# Patient Record
Sex: FEMALE | Race: WHITE | NOT HISPANIC OR LATINO
[De-identification: names, ages, dates, MRNs, and addresses within clinical notes are randomized per-mention and may not be internally consistent; named-entity substitution may affect disease eponyms.]

---

## 2017-01-09 ENCOUNTER — RESULT REVIEW (OUTPATIENT)
Age: 72
End: 2017-01-09

## 2017-01-09 ENCOUNTER — OUTPATIENT (OUTPATIENT)
Dept: OUTPATIENT SERVICES | Facility: HOSPITAL | Age: 72
LOS: 1 days | Discharge: HOME | End: 2017-01-09

## 2017-01-09 DIAGNOSIS — C50.919 MALIGNANT NEOPLASM OF UNSPECIFIED SITE OF UNSPECIFIED FEMALE BREAST: ICD-10-CM

## 2017-01-10 ENCOUNTER — LABORATORY RESULT (OUTPATIENT)
Age: 72
End: 2017-01-10

## 2017-01-10 ENCOUNTER — APPOINTMENT (OUTPATIENT)
Dept: HEMATOLOGY ONCOLOGY | Facility: CLINIC | Age: 72
End: 2017-01-10

## 2017-01-10 VITALS
DIASTOLIC BLOOD PRESSURE: 66 MMHG | RESPIRATION RATE: 16 BRPM | OXYGEN SATURATION: 97 % | HEART RATE: 72 BPM | SYSTOLIC BLOOD PRESSURE: 139 MMHG | TEMPERATURE: 97.6 F

## 2017-01-10 DIAGNOSIS — C50.911 MALIGNANT NEOPLASM OF UNSPECIFIED SITE OF RIGHT FEMALE BREAST: ICD-10-CM

## 2017-01-10 DIAGNOSIS — Z87.891 PERSONAL HISTORY OF NICOTINE DEPENDENCE: ICD-10-CM

## 2017-01-10 DIAGNOSIS — Z13.71 ENCOUNTER FOR NONPROCREATIVE SCREENING FOR GENETIC DISEASE CARRIER STATUS: ICD-10-CM

## 2017-01-10 LAB
HCT VFR BLD CALC: 33.4 % — LOW (ref 34.5–45)
HGB BLD-MCNC: 10.8 G/DL — LOW (ref 11.5–15.5)
MCHC RBC-ENTMCNC: 27.3 PG — SIGNIFICANT CHANGE UP (ref 27–34)
MCHC RBC-ENTMCNC: 32.3 GM/DL — SIGNIFICANT CHANGE UP (ref 32–36)
MCV RBC AUTO: 84.6 FL — SIGNIFICANT CHANGE UP (ref 80–100)
PLATELET # BLD AUTO: 265 K/UL — SIGNIFICANT CHANGE UP (ref 150–400)
RBC # BLD: 3.95 M/UL — SIGNIFICANT CHANGE UP (ref 3.8–5.2)
RBC # FLD: 14.2 % — SIGNIFICANT CHANGE UP (ref 10.3–14.5)
WBC # BLD: 5.4 K/UL — SIGNIFICANT CHANGE UP (ref 3.8–10.5)
WBC # FLD AUTO: 5.4 K/UL — SIGNIFICANT CHANGE UP (ref 3.8–10.5)

## 2017-01-11 DIAGNOSIS — C50.911 MALIGNANT NEOPLASM OF UNSPECIFIED SITE OF RIGHT FEMALE BREAST: ICD-10-CM

## 2017-01-23 ENCOUNTER — CLINICAL ADVICE (OUTPATIENT)
Age: 72
End: 2017-01-23

## 2017-01-26 ENCOUNTER — APPOINTMENT (OUTPATIENT)
Dept: INFUSION THERAPY | Facility: HOSPITAL | Age: 72
End: 2017-01-26

## 2017-01-27 ENCOUNTER — MESSAGE (OUTPATIENT)
Age: 72
End: 2017-01-27

## 2017-01-28 PROBLEM — C44.90 UNSPECIFIED MALIGNANT NEOPLASM OF SKIN, UNSPECIFIED: Chronic | Status: ACTIVE | Noted: 2017-01-26

## 2017-01-28 PROBLEM — K57.90 DIVERTICULOSIS OF INTESTINE, PART UNSPECIFIED, WITHOUT PERFORATION OR ABSCESS WITHOUT BLEEDING: Chronic | Status: ACTIVE | Noted: 2017-01-26

## 2017-01-28 PROBLEM — G62.9 POLYNEUROPATHY, UNSPECIFIED: Chronic | Status: ACTIVE | Noted: 2017-01-26

## 2017-01-29 ENCOUNTER — FORM ENCOUNTER (OUTPATIENT)
Age: 72
End: 2017-01-29

## 2017-01-30 ENCOUNTER — CLINICAL ADVICE (OUTPATIENT)
Age: 72
End: 2017-01-30

## 2017-01-30 ENCOUNTER — OUTPATIENT (OUTPATIENT)
Dept: OUTPATIENT SERVICES | Facility: HOSPITAL | Age: 72
LOS: 1 days | End: 2017-01-30
Payer: MEDICARE

## 2017-01-30 DIAGNOSIS — C50.911 MALIGNANT NEOPLASM OF UNSPECIFIED SITE OF RIGHT FEMALE BREAST: ICD-10-CM

## 2017-01-30 DIAGNOSIS — Z90.11 ACQUIRED ABSENCE OF RIGHT BREAST AND NIPPLE: Chronic | ICD-10-CM

## 2017-01-30 PROCEDURE — 76937 US GUIDE VASCULAR ACCESS: CPT | Mod: 26

## 2017-01-30 PROCEDURE — C1894: CPT

## 2017-01-30 PROCEDURE — 77001 FLUOROGUIDE FOR VEIN DEVICE: CPT | Mod: 26

## 2017-01-30 PROCEDURE — C1769: CPT

## 2017-01-30 PROCEDURE — 77001 FLUOROGUIDE FOR VEIN DEVICE: CPT

## 2017-01-30 PROCEDURE — 36561 INSERT TUNNELED CV CATH: CPT

## 2017-01-30 PROCEDURE — C1788: CPT

## 2017-01-30 PROCEDURE — 76937 US GUIDE VASCULAR ACCESS: CPT

## 2017-01-30 PROCEDURE — C1887: CPT

## 2017-01-31 ENCOUNTER — RESULT REVIEW (OUTPATIENT)
Age: 72
End: 2017-01-31

## 2017-02-01 ENCOUNTER — APPOINTMENT (OUTPATIENT)
Dept: INFUSION THERAPY | Facility: HOSPITAL | Age: 72
End: 2017-02-01

## 2017-02-01 DIAGNOSIS — Z45.2 ENCOUNTER FOR ADJUSTMENT AND MANAGEMENT OF VASCULAR ACCESS DEVICE: ICD-10-CM

## 2017-02-01 DIAGNOSIS — C50.911 MALIGNANT NEOPLASM OF UNSPECIFIED SITE OF RIGHT FEMALE BREAST: ICD-10-CM

## 2017-02-01 LAB
HCT VFR BLD CALC: 38.7 % — SIGNIFICANT CHANGE UP (ref 34.5–45)
HGB BLD-MCNC: 12.2 G/DL — SIGNIFICANT CHANGE UP (ref 11.5–15.5)
MCHC RBC-ENTMCNC: 26.8 PG — LOW (ref 27–34)
MCHC RBC-ENTMCNC: 31.5 GM/DL — LOW (ref 32–36)
MCV RBC AUTO: 85 FL — SIGNIFICANT CHANGE UP (ref 80–100)
PLATELET # BLD AUTO: 230 K/UL — SIGNIFICANT CHANGE UP (ref 150–400)
RBC # BLD: 4.56 M/UL — SIGNIFICANT CHANGE UP (ref 3.8–5.2)
RBC # FLD: 13.8 % — SIGNIFICANT CHANGE UP (ref 10.3–14.5)
WBC # BLD: 6.2 K/UL — SIGNIFICANT CHANGE UP (ref 3.8–10.5)
WBC # FLD AUTO: 6.2 K/UL — SIGNIFICANT CHANGE UP (ref 3.8–10.5)

## 2017-02-02 ENCOUNTER — OTHER (OUTPATIENT)
Age: 72
End: 2017-02-02

## 2017-02-02 DIAGNOSIS — Z51.11 ENCOUNTER FOR ANTINEOPLASTIC CHEMOTHERAPY: ICD-10-CM

## 2017-02-02 DIAGNOSIS — R11.2 NAUSEA WITH VOMITING, UNSPECIFIED: ICD-10-CM

## 2017-02-07 ENCOUNTER — RESULT REVIEW (OUTPATIENT)
Age: 72
End: 2017-02-07

## 2017-02-07 ENCOUNTER — OUTPATIENT (OUTPATIENT)
Dept: OUTPATIENT SERVICES | Facility: HOSPITAL | Age: 72
LOS: 1 days | Discharge: HOME | End: 2017-02-07

## 2017-02-07 DIAGNOSIS — Z90.11 ACQUIRED ABSENCE OF RIGHT BREAST AND NIPPLE: Chronic | ICD-10-CM

## 2017-02-07 DIAGNOSIS — C50.911 MALIGNANT NEOPLASM OF UNSPECIFIED SITE OF RIGHT FEMALE BREAST: ICD-10-CM

## 2017-02-08 ENCOUNTER — APPOINTMENT (OUTPATIENT)
Dept: INFUSION THERAPY | Facility: HOSPITAL | Age: 72
End: 2017-02-08

## 2017-02-08 LAB
BASOPHILS # BLD AUTO: 0 K/UL — SIGNIFICANT CHANGE UP (ref 0–0.2)
BASOPHILS NFR BLD AUTO: 0.7 % — SIGNIFICANT CHANGE UP (ref 0–2)
EOSINOPHIL # BLD AUTO: 0.1 K/UL — SIGNIFICANT CHANGE UP (ref 0–0.5)
EOSINOPHIL NFR BLD AUTO: 2.8 % — SIGNIFICANT CHANGE UP (ref 0–6)
HCT VFR BLD CALC: 35.9 % — SIGNIFICANT CHANGE UP (ref 34.5–45)
HGB BLD-MCNC: 11.3 G/DL — LOW (ref 11.5–15.5)
LYMPHOCYTES # BLD AUTO: 1.4 K/UL — SIGNIFICANT CHANGE UP (ref 1–3.3)
LYMPHOCYTES # BLD AUTO: 31.2 % — SIGNIFICANT CHANGE UP (ref 13–44)
MCHC RBC-ENTMCNC: 26.6 PG — LOW (ref 27–34)
MCHC RBC-ENTMCNC: 31.5 GM/DL — LOW (ref 32–36)
MCV RBC AUTO: 84.4 FL — SIGNIFICANT CHANGE UP (ref 80–100)
MONOCYTES # BLD AUTO: 0.3 K/UL — SIGNIFICANT CHANGE UP (ref 0–0.9)
MONOCYTES NFR BLD AUTO: 6.8 % — SIGNIFICANT CHANGE UP (ref 2–14)
NEUTROPHILS # BLD AUTO: 2.5 K/UL — SIGNIFICANT CHANGE UP (ref 1.8–7.4)
NEUTROPHILS NFR BLD AUTO: 58.4 % — SIGNIFICANT CHANGE UP (ref 43–77)
PLATELET # BLD AUTO: 205 K/UL — SIGNIFICANT CHANGE UP (ref 150–400)
RBC # BLD: 4.25 M/UL — SIGNIFICANT CHANGE UP (ref 3.8–5.2)
RBC # FLD: 13.5 % — SIGNIFICANT CHANGE UP (ref 10.3–14.5)
WBC # BLD: 4.3 K/UL — SIGNIFICANT CHANGE UP (ref 3.8–10.5)
WBC # FLD AUTO: 4.3 K/UL — SIGNIFICANT CHANGE UP (ref 3.8–10.5)

## 2017-02-10 DIAGNOSIS — Z51.11 ENCOUNTER FOR ANTINEOPLASTIC CHEMOTHERAPY: ICD-10-CM

## 2017-02-10 DIAGNOSIS — R11.2 NAUSEA WITH VOMITING, UNSPECIFIED: ICD-10-CM

## 2017-02-15 ENCOUNTER — RESULT REVIEW (OUTPATIENT)
Age: 72
End: 2017-02-15

## 2017-02-16 ENCOUNTER — APPOINTMENT (OUTPATIENT)
Dept: INFUSION THERAPY | Facility: HOSPITAL | Age: 72
End: 2017-02-16

## 2017-02-16 ENCOUNTER — APPOINTMENT (OUTPATIENT)
Dept: HEMATOLOGY ONCOLOGY | Facility: CLINIC | Age: 72
End: 2017-02-16

## 2017-02-16 VITALS
SYSTOLIC BLOOD PRESSURE: 143 MMHG | TEMPERATURE: 97.2 F | HEART RATE: 69 BPM | OXYGEN SATURATION: 100 % | WEIGHT: 174.16 LBS | RESPIRATION RATE: 16 BRPM | BODY MASS INDEX: 28.11 KG/M2 | DIASTOLIC BLOOD PRESSURE: 76 MMHG

## 2017-02-16 DIAGNOSIS — J34.89 OTHER SPECIFIED DISORDERS OF NOSE AND NASAL SINUSES: ICD-10-CM

## 2017-02-16 LAB
BASOPHILS # BLD AUTO: 0 K/UL — SIGNIFICANT CHANGE UP (ref 0–0.2)
EOSINOPHIL # BLD AUTO: 0 K/UL — SIGNIFICANT CHANGE UP (ref 0–0.5)
EOSINOPHIL NFR BLD AUTO: 3 % — SIGNIFICANT CHANGE UP (ref 0–6)
HCT VFR BLD CALC: 35 % — SIGNIFICANT CHANGE UP (ref 34.5–45)
HGB BLD-MCNC: 11 G/DL — LOW (ref 11.5–15.5)
LYMPHOCYTES # BLD AUTO: 1 K/UL — SIGNIFICANT CHANGE UP (ref 1–3.3)
LYMPHOCYTES # BLD AUTO: 29 % — SIGNIFICANT CHANGE UP (ref 13–44)
MCHC RBC-ENTMCNC: 26.6 PG — LOW (ref 27–34)
MCHC RBC-ENTMCNC: 31.5 G/DL — LOW (ref 32–36)
MCV RBC AUTO: 84.5 FL — SIGNIFICANT CHANGE UP (ref 80–100)
MONOCYTES # BLD AUTO: 0.1 K/UL — SIGNIFICANT CHANGE UP (ref 0–0.9)
MONOCYTES NFR BLD AUTO: 2 % — SIGNIFICANT CHANGE UP (ref 2–14)
NEUTROPHILS # BLD AUTO: 2.1 K/UL — SIGNIFICANT CHANGE UP (ref 1.8–7.4)
NEUTROPHILS NFR BLD AUTO: 65 % — SIGNIFICANT CHANGE UP (ref 43–77)
NEUTS BAND # BLD: 1 % — SIGNIFICANT CHANGE UP (ref 0–8)
PLAT MORPH BLD: NORMAL — SIGNIFICANT CHANGE UP
PLATELET # BLD AUTO: 167 K/UL — SIGNIFICANT CHANGE UP (ref 150–400)
RBC # BLD: 4.15 M/UL — SIGNIFICANT CHANGE UP (ref 3.8–5.2)
RBC # FLD: 13.6 % — SIGNIFICANT CHANGE UP (ref 10.3–14.5)
RBC BLD AUTO: SIGNIFICANT CHANGE UP
WBC # BLD: 3.2 K/UL — LOW (ref 3.8–10.5)
WBC # FLD AUTO: 3.2 K/UL — LOW (ref 3.8–10.5)

## 2017-02-28 ENCOUNTER — RESULT REVIEW (OUTPATIENT)
Age: 72
End: 2017-02-28

## 2017-02-28 ENCOUNTER — OTHER (OUTPATIENT)
Age: 72
End: 2017-02-28

## 2017-03-01 ENCOUNTER — LABORATORY RESULT (OUTPATIENT)
Age: 72
End: 2017-03-01

## 2017-03-01 ENCOUNTER — APPOINTMENT (OUTPATIENT)
Dept: INFUSION THERAPY | Facility: HOSPITAL | Age: 72
End: 2017-03-01

## 2017-03-01 LAB
BASOPHILS # BLD AUTO: 0 K/UL — SIGNIFICANT CHANGE UP (ref 0–0.2)
BASOPHILS NFR BLD AUTO: 0.8 % — SIGNIFICANT CHANGE UP (ref 0–2)
EOSINOPHIL # BLD AUTO: 0.2 K/UL — SIGNIFICANT CHANGE UP (ref 0–0.5)
EOSINOPHIL NFR BLD AUTO: 5.5 % — SIGNIFICANT CHANGE UP (ref 0–6)
HCT VFR BLD CALC: 34.8 % — SIGNIFICANT CHANGE UP (ref 34.5–45)
HGB BLD-MCNC: 11.6 G/DL — SIGNIFICANT CHANGE UP (ref 11.5–15.5)
LYMPHOCYTES # BLD AUTO: 1 K/UL — SIGNIFICANT CHANGE UP (ref 1–3.3)
LYMPHOCYTES # BLD AUTO: 31.2 % — SIGNIFICANT CHANGE UP (ref 13–44)
MCHC RBC-ENTMCNC: 27.9 PG — SIGNIFICANT CHANGE UP (ref 27–34)
MCHC RBC-ENTMCNC: 33.4 G/DL — SIGNIFICANT CHANGE UP (ref 32–36)
MCV RBC AUTO: 83.7 FL — SIGNIFICANT CHANGE UP (ref 80–100)
MONOCYTES # BLD AUTO: 0.5 K/UL — SIGNIFICANT CHANGE UP (ref 0–0.9)
MONOCYTES NFR BLD AUTO: 15.5 % — HIGH (ref 2–14)
NEUTROPHILS # BLD AUTO: 1.6 K/UL — LOW (ref 1.8–7.4)
NEUTROPHILS NFR BLD AUTO: 47 % — SIGNIFICANT CHANGE UP (ref 43–77)
PLATELET # BLD AUTO: 189 K/UL — SIGNIFICANT CHANGE UP (ref 150–400)
RBC # BLD: 4.16 M/UL — SIGNIFICANT CHANGE UP (ref 3.8–5.2)
RBC # FLD: 15.3 % — HIGH (ref 10.3–14.5)
WBC # BLD: 3.4 K/UL — LOW (ref 3.8–10.5)
WBC # FLD AUTO: 3.4 K/UL — LOW (ref 3.8–10.5)

## 2017-03-06 ENCOUNTER — OUTPATIENT (OUTPATIENT)
Dept: OUTPATIENT SERVICES | Facility: HOSPITAL | Age: 72
LOS: 1 days | Discharge: HOME | End: 2017-03-06

## 2017-03-06 DIAGNOSIS — C50.911 MALIGNANT NEOPLASM OF UNSPECIFIED SITE OF RIGHT FEMALE BREAST: ICD-10-CM

## 2017-03-06 DIAGNOSIS — Z90.11 ACQUIRED ABSENCE OF RIGHT BREAST AND NIPPLE: Chronic | ICD-10-CM

## 2017-03-07 ENCOUNTER — RESULT REVIEW (OUTPATIENT)
Age: 72
End: 2017-03-07

## 2017-03-08 ENCOUNTER — APPOINTMENT (OUTPATIENT)
Dept: INFUSION THERAPY | Facility: HOSPITAL | Age: 72
End: 2017-03-08

## 2017-03-08 ENCOUNTER — OTHER (OUTPATIENT)
Age: 72
End: 2017-03-08

## 2017-03-08 LAB
BASOPHILS # BLD AUTO: 0 K/UL — SIGNIFICANT CHANGE UP (ref 0–0.2)
BASOPHILS NFR BLD AUTO: 0.2 % — SIGNIFICANT CHANGE UP (ref 0–2)
EOSINOPHIL # BLD AUTO: 0.1 K/UL — SIGNIFICANT CHANGE UP (ref 0–0.5)
EOSINOPHIL NFR BLD AUTO: 2.8 % — SIGNIFICANT CHANGE UP (ref 0–6)
HCT VFR BLD CALC: 35 % — SIGNIFICANT CHANGE UP (ref 34.5–45)
HGB BLD-MCNC: 11.6 G/DL — SIGNIFICANT CHANGE UP (ref 11.5–15.5)
LYMPHOCYTES # BLD AUTO: 0.7 K/UL — LOW (ref 1–3.3)
LYMPHOCYTES # BLD AUTO: 24.4 % — SIGNIFICANT CHANGE UP (ref 13–44)
MCHC RBC-ENTMCNC: 27.8 PG — SIGNIFICANT CHANGE UP (ref 27–34)
MCHC RBC-ENTMCNC: 33.2 G/DL — SIGNIFICANT CHANGE UP (ref 32–36)
MCV RBC AUTO: 83.7 FL — SIGNIFICANT CHANGE UP (ref 80–100)
MONOCYTES # BLD AUTO: 0.2 K/UL — SIGNIFICANT CHANGE UP (ref 0–0.9)
MONOCYTES NFR BLD AUTO: 5.1 % — SIGNIFICANT CHANGE UP (ref 2–14)
NEUTROPHILS # BLD AUTO: 2 K/UL — SIGNIFICANT CHANGE UP (ref 1.8–7.4)
NEUTROPHILS NFR BLD AUTO: 67.4 % — SIGNIFICANT CHANGE UP (ref 43–77)
PLATELET # BLD AUTO: 135 K/UL — LOW (ref 150–400)
RBC # BLD: 4.18 M/UL — SIGNIFICANT CHANGE UP (ref 3.8–5.2)
RBC # FLD: 15.5 % — HIGH (ref 10.3–14.5)
WBC # BLD: 3 K/UL — LOW (ref 3.8–10.5)
WBC # FLD AUTO: 3 K/UL — LOW (ref 3.8–10.5)

## 2017-03-09 DIAGNOSIS — Z51.11 ENCOUNTER FOR ANTINEOPLASTIC CHEMOTHERAPY: ICD-10-CM

## 2017-03-09 DIAGNOSIS — R11.2 NAUSEA WITH VOMITING, UNSPECIFIED: ICD-10-CM

## 2017-03-23 ENCOUNTER — RX RENEWAL (OUTPATIENT)
Age: 72
End: 2017-03-23

## 2017-03-27 ENCOUNTER — APPOINTMENT (OUTPATIENT)
Dept: HEMATOLOGY ONCOLOGY | Facility: CLINIC | Age: 72
End: 2017-03-27

## 2017-03-27 VITALS
SYSTOLIC BLOOD PRESSURE: 110 MMHG | HEART RATE: 84 BPM | DIASTOLIC BLOOD PRESSURE: 60 MMHG | TEMPERATURE: 98.1 F | OXYGEN SATURATION: 98 % | WEIGHT: 178.57 LBS | BODY MASS INDEX: 28.82 KG/M2

## 2017-03-27 DIAGNOSIS — F32.9 MAJOR DEPRESSIVE DISORDER, SINGLE EPISODE, UNSPECIFIED: ICD-10-CM

## 2017-03-27 RX ORDER — OMEGA-3/DHA/EPA/FISH OIL 300-1000MG
1000 CAPSULE ORAL
Refills: 0 | Status: DISCONTINUED | COMMUNITY
Start: 2017-01-10 | End: 2017-03-27

## 2017-03-27 RX ORDER — NAPROXEN SODIUM 220 MG
220 TABLET ORAL
Refills: 0 | Status: DISCONTINUED | COMMUNITY
Start: 2017-01-10 | End: 2017-03-27

## 2017-03-27 RX ORDER — ACETAMINOPHEN, ASPIRIN 250; 250 MG/1; MG/1
250-250 TABLET, FILM COATED ORAL
Refills: 0 | Status: DISCONTINUED | COMMUNITY
Start: 2017-01-10 | End: 2017-03-27

## 2017-03-28 ENCOUNTER — RESULT REVIEW (OUTPATIENT)
Age: 72
End: 2017-03-28

## 2017-03-29 ENCOUNTER — LABORATORY RESULT (OUTPATIENT)
Age: 72
End: 2017-03-29

## 2017-03-29 ENCOUNTER — APPOINTMENT (OUTPATIENT)
Dept: INFUSION THERAPY | Facility: HOSPITAL | Age: 72
End: 2017-03-29

## 2017-03-29 LAB
HCT VFR BLD CALC: 34 % — LOW (ref 34.5–45)
HGB BLD-MCNC: 11.4 G/DL — LOW (ref 11.5–15.5)
MCHC RBC-ENTMCNC: 28.1 PG — SIGNIFICANT CHANGE UP (ref 27–34)
MCHC RBC-ENTMCNC: 33.5 G/DL — SIGNIFICANT CHANGE UP (ref 32–36)
MCV RBC AUTO: 83.9 FL — SIGNIFICANT CHANGE UP (ref 80–100)
PLATELET # BLD AUTO: 198 K/UL — SIGNIFICANT CHANGE UP (ref 150–400)
RBC # BLD: 4.05 M/UL — SIGNIFICANT CHANGE UP (ref 3.8–5.2)
RBC # FLD: 17.5 % — HIGH (ref 10.3–14.5)
WBC # BLD: 4.1 K/UL — SIGNIFICANT CHANGE UP (ref 3.8–10.5)
WBC # FLD AUTO: 4.1 K/UL — SIGNIFICANT CHANGE UP (ref 3.8–10.5)

## 2017-04-04 ENCOUNTER — RESULT REVIEW (OUTPATIENT)
Age: 72
End: 2017-04-04

## 2017-04-05 ENCOUNTER — APPOINTMENT (OUTPATIENT)
Dept: INFUSION THERAPY | Facility: HOSPITAL | Age: 72
End: 2017-04-05

## 2017-04-05 LAB
BASOPHILS # BLD AUTO: 0 K/UL — SIGNIFICANT CHANGE UP (ref 0–0.2)
BASOPHILS NFR BLD AUTO: 0.3 % — SIGNIFICANT CHANGE UP (ref 0–2)
EOSINOPHIL # BLD AUTO: 0.2 K/UL — SIGNIFICANT CHANGE UP (ref 0–0.5)
EOSINOPHIL NFR BLD AUTO: 5.6 % — SIGNIFICANT CHANGE UP (ref 0–6)
HCT VFR BLD CALC: 31.9 % — LOW (ref 34.5–45)
HGB BLD-MCNC: 10.5 G/DL — LOW (ref 11.5–15.5)
LYMPHOCYTES # BLD AUTO: 0.7 K/UL — LOW (ref 1–3.3)
LYMPHOCYTES # BLD AUTO: 18.4 % — SIGNIFICANT CHANGE UP (ref 13–44)
MCHC RBC-ENTMCNC: 27.8 PG — SIGNIFICANT CHANGE UP (ref 27–34)
MCHC RBC-ENTMCNC: 32.8 G/DL — SIGNIFICANT CHANGE UP (ref 32–36)
MCV RBC AUTO: 84.7 FL — SIGNIFICANT CHANGE UP (ref 80–100)
MONOCYTES # BLD AUTO: 0.4 K/UL — SIGNIFICANT CHANGE UP (ref 0–0.9)
MONOCYTES NFR BLD AUTO: 9.3 % — SIGNIFICANT CHANGE UP (ref 2–14)
NEUTROPHILS # BLD AUTO: 2.5 K/UL — SIGNIFICANT CHANGE UP (ref 1.8–7.4)
NEUTROPHILS NFR BLD AUTO: 66.4 % — SIGNIFICANT CHANGE UP (ref 43–77)
PLATELET # BLD AUTO: 147 K/UL — LOW (ref 150–400)
RBC # BLD: 3.77 M/UL — LOW (ref 3.8–5.2)
RBC # FLD: 17.2 % — HIGH (ref 10.3–14.5)
WBC # BLD: 3.8 K/UL — SIGNIFICANT CHANGE UP (ref 3.8–10.5)
WBC # FLD AUTO: 3.8 K/UL — SIGNIFICANT CHANGE UP (ref 3.8–10.5)

## 2017-04-07 ENCOUNTER — RX RENEWAL (OUTPATIENT)
Age: 72
End: 2017-04-07

## 2017-04-10 ENCOUNTER — RESULT REVIEW (OUTPATIENT)
Age: 72
End: 2017-04-10

## 2017-04-10 ENCOUNTER — OUTPATIENT (OUTPATIENT)
Dept: OUTPATIENT SERVICES | Facility: HOSPITAL | Age: 72
LOS: 1 days | Discharge: HOME | End: 2017-04-10

## 2017-04-10 DIAGNOSIS — Z90.11 ACQUIRED ABSENCE OF RIGHT BREAST AND NIPPLE: Chronic | ICD-10-CM

## 2017-04-10 DIAGNOSIS — C50.911 MALIGNANT NEOPLASM OF UNSPECIFIED SITE OF RIGHT FEMALE BREAST: ICD-10-CM

## 2017-04-11 ENCOUNTER — LABORATORY RESULT (OUTPATIENT)
Age: 72
End: 2017-04-11

## 2017-04-11 ENCOUNTER — APPOINTMENT (OUTPATIENT)
Dept: HEMATOLOGY ONCOLOGY | Facility: CLINIC | Age: 72
End: 2017-04-11

## 2017-04-11 VITALS
SYSTOLIC BLOOD PRESSURE: 114 MMHG | TEMPERATURE: 98 F | BODY MASS INDEX: 28.29 KG/M2 | RESPIRATION RATE: 16 BRPM | DIASTOLIC BLOOD PRESSURE: 64 MMHG | OXYGEN SATURATION: 100 % | HEART RATE: 93 BPM | WEIGHT: 175.26 LBS

## 2017-04-11 LAB
BASOPHILS # BLD AUTO: 0 K/UL — SIGNIFICANT CHANGE UP (ref 0–0.2)
BASOPHILS NFR BLD AUTO: 0.3 % — SIGNIFICANT CHANGE UP (ref 0–2)
EOSINOPHIL # BLD AUTO: 0.3 K/UL — SIGNIFICANT CHANGE UP (ref 0–0.5)
EOSINOPHIL NFR BLD AUTO: 7.1 % — HIGH (ref 0–6)
HCT VFR BLD CALC: 32.9 % — LOW (ref 34.5–45)
HGB BLD-MCNC: 11 G/DL — LOW (ref 11.5–15.5)
LYMPHOCYTES # BLD AUTO: 0.4 K/UL — LOW (ref 1–3.3)
LYMPHOCYTES # BLD AUTO: 9.9 % — LOW (ref 13–44)
MCHC RBC-ENTMCNC: 28.4 PG — SIGNIFICANT CHANGE UP (ref 27–34)
MCHC RBC-ENTMCNC: 33.4 G/DL — SIGNIFICANT CHANGE UP (ref 32–36)
MCV RBC AUTO: 85.1 FL — SIGNIFICANT CHANGE UP (ref 80–100)
MONOCYTES # BLD AUTO: 0 K/UL — SIGNIFICANT CHANGE UP (ref 0–0.9)
MONOCYTES NFR BLD AUTO: 1.2 % — LOW (ref 2–14)
NEUTROPHILS # BLD AUTO: 3 K/UL — SIGNIFICANT CHANGE UP (ref 1.8–7.4)
NEUTROPHILS NFR BLD AUTO: 81.5 % — HIGH (ref 43–77)
PLATELET # BLD AUTO: 133 K/UL — LOW (ref 150–400)
RBC # BLD: 3.86 M/UL — SIGNIFICANT CHANGE UP (ref 3.8–5.2)
RBC # FLD: 17.5 % — HIGH (ref 10.3–14.5)
TSH SERPL-ACNC: 2.8 UIU/ML
WBC # BLD: 3.7 K/UL — LOW (ref 3.8–10.5)
WBC # FLD AUTO: 3.7 K/UL — LOW (ref 3.8–10.5)

## 2017-04-11 RX ORDER — ACYCLOVIR 50 MG/G
5 OINTMENT TOPICAL 3 TIMES DAILY
Qty: 1 | Refills: 1 | Status: DISCONTINUED | COMMUNITY
Start: 2017-02-16 | End: 2017-04-11

## 2017-04-12 ENCOUNTER — CLINICAL ADVICE (OUTPATIENT)
Age: 72
End: 2017-04-12

## 2017-04-12 LAB
T3RU NFR SERPL: 1.2 INDEX
T4 SERPL-MCNC: 10.9 UG/DL

## 2017-04-25 ENCOUNTER — CLINICAL ADVICE (OUTPATIENT)
Age: 72
End: 2017-04-25

## 2017-04-27 ENCOUNTER — APPOINTMENT (OUTPATIENT)
Dept: HEMATOLOGY ONCOLOGY | Facility: CLINIC | Age: 72
End: 2017-04-27

## 2017-04-27 VITALS
BODY MASS INDEX: 28.82 KG/M2 | TEMPERATURE: 97.7 F | OXYGEN SATURATION: 96 % | DIASTOLIC BLOOD PRESSURE: 60 MMHG | RESPIRATION RATE: 16 BRPM | HEART RATE: 99 BPM | WEIGHT: 178.55 LBS | SYSTOLIC BLOOD PRESSURE: 110 MMHG

## 2017-05-02 ENCOUNTER — RESULT REVIEW (OUTPATIENT)
Age: 72
End: 2017-05-02

## 2017-05-03 ENCOUNTER — LABORATORY RESULT (OUTPATIENT)
Age: 72
End: 2017-05-03

## 2017-05-03 ENCOUNTER — APPOINTMENT (OUTPATIENT)
Dept: INFUSION THERAPY | Facility: HOSPITAL | Age: 72
End: 2017-05-03

## 2017-05-03 LAB
HCT VFR BLD CALC: 32.3 % — LOW (ref 34.5–45)
HGB BLD-MCNC: 10.7 G/DL — LOW (ref 11.5–15.5)
MCHC RBC-ENTMCNC: 29.8 PG — SIGNIFICANT CHANGE UP (ref 27–34)
MCHC RBC-ENTMCNC: 33.2 G/DL — SIGNIFICANT CHANGE UP (ref 32–36)
MCV RBC AUTO: 89.9 FL — SIGNIFICANT CHANGE UP (ref 80–100)
PLATELET # BLD AUTO: 195 K/UL — SIGNIFICANT CHANGE UP (ref 150–400)
RBC # BLD: 3.59 M/UL — LOW (ref 3.8–5.2)
RBC # FLD: 19.7 % — HIGH (ref 10.3–14.5)
WBC # BLD: 5.9 K/UL — SIGNIFICANT CHANGE UP (ref 3.8–10.5)
WBC # FLD AUTO: 5.9 K/UL — SIGNIFICANT CHANGE UP (ref 3.8–10.5)

## 2017-05-04 DIAGNOSIS — Z51.11 ENCOUNTER FOR ANTINEOPLASTIC CHEMOTHERAPY: ICD-10-CM

## 2017-05-04 DIAGNOSIS — R11.2 NAUSEA WITH VOMITING, UNSPECIFIED: ICD-10-CM

## 2017-05-09 ENCOUNTER — OUTPATIENT (OUTPATIENT)
Dept: OUTPATIENT SERVICES | Facility: HOSPITAL | Age: 72
LOS: 1 days | Discharge: HOME | End: 2017-05-09

## 2017-05-09 DIAGNOSIS — Z90.11 ACQUIRED ABSENCE OF RIGHT BREAST AND NIPPLE: Chronic | ICD-10-CM

## 2017-05-09 DIAGNOSIS — C50.911 MALIGNANT NEOPLASM OF UNSPECIFIED SITE OF RIGHT FEMALE BREAST: ICD-10-CM

## 2017-05-10 ENCOUNTER — APPOINTMENT (OUTPATIENT)
Dept: INFUSION THERAPY | Facility: HOSPITAL | Age: 72
End: 2017-05-10

## 2017-05-10 ENCOUNTER — RESULT REVIEW (OUTPATIENT)
Age: 72
End: 2017-05-10

## 2017-05-10 LAB
HCT VFR BLD CALC: 30.4 % — LOW (ref 34.5–45)
HGB BLD-MCNC: 10.4 G/DL — LOW (ref 11.5–15.5)
MCHC RBC-ENTMCNC: 30.6 PG — SIGNIFICANT CHANGE UP (ref 27–34)
MCHC RBC-ENTMCNC: 34.3 G/DL — SIGNIFICANT CHANGE UP (ref 32–36)
MCV RBC AUTO: 89.3 FL — SIGNIFICANT CHANGE UP (ref 80–100)
PLATELET # BLD AUTO: 163 K/UL — SIGNIFICANT CHANGE UP (ref 150–400)
RBC # BLD: 3.4 M/UL — LOW (ref 3.8–5.2)
RBC # FLD: 18.9 % — HIGH (ref 10.3–14.5)
WBC # BLD: 4.8 K/UL — SIGNIFICANT CHANGE UP (ref 3.8–10.5)
WBC # FLD AUTO: 4.8 K/UL — SIGNIFICANT CHANGE UP (ref 3.8–10.5)

## 2017-05-11 DIAGNOSIS — Z51.11 ENCOUNTER FOR ANTINEOPLASTIC CHEMOTHERAPY: ICD-10-CM

## 2017-05-11 DIAGNOSIS — R11.2 NAUSEA WITH VOMITING, UNSPECIFIED: ICD-10-CM

## 2017-05-12 ENCOUNTER — CLINICAL ADVICE (OUTPATIENT)
Age: 72
End: 2017-05-12

## 2017-05-22 ENCOUNTER — RESULT REVIEW (OUTPATIENT)
Age: 72
End: 2017-05-22

## 2017-05-22 ENCOUNTER — APPOINTMENT (OUTPATIENT)
Dept: HEMATOLOGY ONCOLOGY | Facility: CLINIC | Age: 72
End: 2017-05-22

## 2017-05-22 VITALS
TEMPERATURE: 98.4 F | HEART RATE: 79 BPM | OXYGEN SATURATION: 97 % | RESPIRATION RATE: 16 BRPM | BODY MASS INDEX: 29.07 KG/M2 | WEIGHT: 180.11 LBS | DIASTOLIC BLOOD PRESSURE: 68 MMHG | SYSTOLIC BLOOD PRESSURE: 117 MMHG

## 2017-05-22 LAB
BASOPHILS NFR BLD AUTO: 2 % — SIGNIFICANT CHANGE UP (ref 0–2)
EOSINOPHIL NFR BLD AUTO: 8 % — HIGH (ref 0–6)
HCT VFR BLD CALC: 29.3 % — LOW (ref 34.5–45)
HGB BLD-MCNC: 9.9 G/DL — LOW (ref 11.5–15.5)
LYMPHOCYTES # BLD AUTO: 0.5 K/UL — LOW (ref 1–3.3)
LYMPHOCYTES # BLD AUTO: 53 % — HIGH (ref 13–44)
MCHC RBC-ENTMCNC: 31.9 PG — SIGNIFICANT CHANGE UP (ref 27–34)
MCHC RBC-ENTMCNC: 34 G/DL — SIGNIFICANT CHANGE UP (ref 32–36)
MCV RBC AUTO: 93.9 FL — SIGNIFICANT CHANGE UP (ref 80–100)
MONOCYTES # BLD AUTO: 0.3 K/UL — SIGNIFICANT CHANGE UP (ref 0–0.9)
MONOCYTES NFR BLD AUTO: 17 % — HIGH (ref 2–14)
NEUTROPHILS # BLD AUTO: 0.4 K/UL — LOW (ref 1.8–7.4)
NEUTROPHILS NFR BLD AUTO: 19 % — LOW (ref 43–77)
NEUTS BAND # BLD: 1 % — SIGNIFICANT CHANGE UP (ref 0–8)
PLAT MORPH BLD: NORMAL — SIGNIFICANT CHANGE UP
PLATELET # BLD AUTO: 155 K/UL — SIGNIFICANT CHANGE UP (ref 150–400)
RBC # BLD: 3.12 M/UL — LOW (ref 3.8–5.2)
RBC # FLD: 20 % — HIGH (ref 10.3–14.5)
RBC BLD AUTO: SIGNIFICANT CHANGE UP
WBC # BLD: 1.2 K/UL — LOW (ref 3.8–10.5)
WBC # FLD AUTO: 1.2 K/UL — LOW (ref 3.8–10.5)

## 2017-05-22 RX ORDER — CYCLOPHOSPHAMIDE 25 MG/1
25 CAPSULE ORAL
Qty: 14 | Refills: 6 | Status: DISCONTINUED | COMMUNITY
Start: 2017-01-30 | End: 2017-05-22

## 2017-05-31 ENCOUNTER — RESULT REVIEW (OUTPATIENT)
Age: 72
End: 2017-05-31

## 2017-05-31 ENCOUNTER — APPOINTMENT (OUTPATIENT)
Dept: INFUSION THERAPY | Facility: HOSPITAL | Age: 72
End: 2017-05-31

## 2017-05-31 LAB
ANISOCYTOSIS BLD QL: SLIGHT — SIGNIFICANT CHANGE UP
BASO STIPL BLD QL SMEAR: PRESENT — SIGNIFICANT CHANGE UP
BASOPHILS # BLD AUTO: 0 K/UL — SIGNIFICANT CHANGE UP (ref 0–0.2)
BASOPHILS NFR BLD AUTO: 2 % — SIGNIFICANT CHANGE UP (ref 0–2)
DACRYOCYTES BLD QL SMEAR: SLIGHT — SIGNIFICANT CHANGE UP
ELLIPTOCYTES BLD QL SMEAR: SLIGHT — SIGNIFICANT CHANGE UP
EOSINOPHIL # BLD AUTO: 0 K/UL — SIGNIFICANT CHANGE UP (ref 0–0.5)
EOSINOPHIL NFR BLD AUTO: 1 % — SIGNIFICANT CHANGE UP (ref 0–6)
HCT VFR BLD CALC: 32.8 % — LOW (ref 34.5–45)
HGB BLD-MCNC: 10.8 G/DL — LOW (ref 11.5–15.5)
HYPOCHROMIA BLD QL: SLIGHT — SIGNIFICANT CHANGE UP
LYMPHOCYTES # BLD AUTO: 0.9 K/UL — LOW (ref 1–3.3)
LYMPHOCYTES # BLD AUTO: 25 % — SIGNIFICANT CHANGE UP (ref 13–44)
MACROCYTES BLD QL: SLIGHT — SIGNIFICANT CHANGE UP
MCHC RBC-ENTMCNC: 31.4 PG — SIGNIFICANT CHANGE UP (ref 27–34)
MCHC RBC-ENTMCNC: 32.8 G/DL — SIGNIFICANT CHANGE UP (ref 32–36)
MCV RBC AUTO: 95.7 FL — SIGNIFICANT CHANGE UP (ref 80–100)
MONOCYTES # BLD AUTO: 0.6 K/UL — SIGNIFICANT CHANGE UP (ref 0–0.9)
MONOCYTES NFR BLD AUTO: 21 % — HIGH (ref 2–14)
NEUTROPHILS # BLD AUTO: 1.8 K/UL — SIGNIFICANT CHANGE UP (ref 1.8–7.4)
NEUTROPHILS NFR BLD AUTO: 51 % — SIGNIFICANT CHANGE UP (ref 43–77)
PLAT MORPH BLD: NORMAL — SIGNIFICANT CHANGE UP
PLATELET # BLD AUTO: 174 K/UL — SIGNIFICANT CHANGE UP (ref 150–400)
POIKILOCYTOSIS BLD QL AUTO: SLIGHT — SIGNIFICANT CHANGE UP
POLYCHROMASIA BLD QL SMEAR: SLIGHT — SIGNIFICANT CHANGE UP
RBC # BLD: 3.42 M/UL — LOW (ref 3.8–5.2)
RBC # FLD: 19 % — HIGH (ref 10.3–14.5)
RBC BLD AUTO: ABNORMAL
WBC # BLD: 3.3 K/UL — LOW (ref 3.8–10.5)
WBC # FLD AUTO: 3.3 K/UL — LOW (ref 3.8–10.5)

## 2017-06-01 ENCOUNTER — MESSAGE (OUTPATIENT)
Age: 72
End: 2017-06-01

## 2017-06-07 ENCOUNTER — LABORATORY RESULT (OUTPATIENT)
Age: 72
End: 2017-06-07

## 2017-06-07 ENCOUNTER — RESULT REVIEW (OUTPATIENT)
Age: 72
End: 2017-06-07

## 2017-06-07 ENCOUNTER — APPOINTMENT (OUTPATIENT)
Dept: INFUSION THERAPY | Facility: HOSPITAL | Age: 72
End: 2017-06-07

## 2017-06-07 LAB
HCT VFR BLD CALC: 30.4 % — LOW (ref 34.5–45)
HGB BLD-MCNC: 10.1 G/DL — LOW (ref 11.5–15.5)
MCHC RBC-ENTMCNC: 31.9 PG — SIGNIFICANT CHANGE UP (ref 27–34)
MCHC RBC-ENTMCNC: 33.2 G/DL — SIGNIFICANT CHANGE UP (ref 32–36)
MCV RBC AUTO: 96.3 FL — SIGNIFICANT CHANGE UP (ref 80–100)
PLATELET # BLD AUTO: 128 K/UL — LOW (ref 150–400)
RBC # BLD: 3.16 M/UL — LOW (ref 3.8–5.2)
RBC # FLD: 17 % — HIGH (ref 10.3–14.5)
WBC # BLD: 4.2 K/UL — SIGNIFICANT CHANGE UP (ref 3.8–10.5)
WBC # FLD AUTO: 4.2 K/UL — SIGNIFICANT CHANGE UP (ref 3.8–10.5)

## 2017-06-09 ENCOUNTER — RESULT REVIEW (OUTPATIENT)
Age: 72
End: 2017-06-09

## 2017-06-14 ENCOUNTER — OUTPATIENT (OUTPATIENT)
Dept: OUTPATIENT SERVICES | Facility: HOSPITAL | Age: 72
LOS: 1 days | Discharge: HOME | End: 2017-06-14

## 2017-06-14 DIAGNOSIS — C50.911 MALIGNANT NEOPLASM OF UNSPECIFIED SITE OF RIGHT FEMALE BREAST: ICD-10-CM

## 2017-06-14 DIAGNOSIS — Z90.11 ACQUIRED ABSENCE OF RIGHT BREAST AND NIPPLE: Chronic | ICD-10-CM

## 2017-06-21 ENCOUNTER — APPOINTMENT (OUTPATIENT)
Dept: INFUSION THERAPY | Facility: HOSPITAL | Age: 72
End: 2017-06-21

## 2017-06-22 ENCOUNTER — RESULT REVIEW (OUTPATIENT)
Age: 72
End: 2017-06-22

## 2017-06-22 ENCOUNTER — APPOINTMENT (OUTPATIENT)
Dept: HEMATOLOGY ONCOLOGY | Facility: CLINIC | Age: 72
End: 2017-06-22

## 2017-06-22 VITALS
TEMPERATURE: 98.5 F | WEIGHT: 179.9 LBS | OXYGEN SATURATION: 97 % | DIASTOLIC BLOOD PRESSURE: 71 MMHG | BODY MASS INDEX: 29.04 KG/M2 | HEART RATE: 81 BPM | RESPIRATION RATE: 16 BRPM | SYSTOLIC BLOOD PRESSURE: 152 MMHG

## 2017-06-22 LAB
ANISOCYTOSIS BLD QL: SLIGHT — SIGNIFICANT CHANGE UP
BASO STIPL BLD QL SMEAR: PRESENT — SIGNIFICANT CHANGE UP
BASOPHILS # BLD AUTO: 0 K/UL — SIGNIFICANT CHANGE UP (ref 0–0.2)
BASOPHILS NFR BLD AUTO: 3 % — HIGH (ref 0–2)
DACRYOCYTES BLD QL SMEAR: SLIGHT — SIGNIFICANT CHANGE UP
ELLIPTOCYTES BLD QL SMEAR: SLIGHT — SIGNIFICANT CHANGE UP
EOSINOPHIL # BLD AUTO: 0.1 K/UL — SIGNIFICANT CHANGE UP (ref 0–0.5)
EOSINOPHIL NFR BLD AUTO: 8 % — HIGH (ref 0–6)
HCT VFR BLD CALC: 30 % — LOW (ref 34.5–45)
HGB BLD-MCNC: 10.1 G/DL — LOW (ref 11.5–15.5)
HYPOCHROMIA BLD QL: SLIGHT — SIGNIFICANT CHANGE UP
LYMPHOCYTES # BLD AUTO: 0.7 K/UL — LOW (ref 1–3.3)
LYMPHOCYTES # BLD AUTO: 40 % — SIGNIFICANT CHANGE UP (ref 13–44)
MACROCYTES BLD QL: SLIGHT — SIGNIFICANT CHANGE UP
MCHC RBC-ENTMCNC: 33.4 PG — SIGNIFICANT CHANGE UP (ref 27–34)
MCHC RBC-ENTMCNC: 33.7 G/DL — SIGNIFICANT CHANGE UP (ref 32–36)
MCV RBC AUTO: 99.2 FL — SIGNIFICANT CHANGE UP (ref 80–100)
MONOCYTES # BLD AUTO: 0.3 K/UL — SIGNIFICANT CHANGE UP (ref 0–0.9)
MONOCYTES NFR BLD AUTO: 13 % — SIGNIFICANT CHANGE UP (ref 2–14)
NEUTROPHILS # BLD AUTO: 0.7 K/UL — LOW (ref 1.8–7.4)
NEUTROPHILS NFR BLD AUTO: 33 % — LOW (ref 43–77)
NEUTS BAND # BLD: 3 % — SIGNIFICANT CHANGE UP (ref 0–8)
PLAT MORPH BLD: NORMAL — SIGNIFICANT CHANGE UP
PLATELET # BLD AUTO: 162 K/UL — SIGNIFICANT CHANGE UP (ref 150–400)
POIKILOCYTOSIS BLD QL AUTO: SLIGHT — SIGNIFICANT CHANGE UP
POLYCHROMASIA BLD QL SMEAR: SLIGHT — SIGNIFICANT CHANGE UP
RBC # BLD: 3.02 M/UL — LOW (ref 3.8–5.2)
RBC # FLD: 16.7 % — HIGH (ref 10.3–14.5)
RBC BLD AUTO: ABNORMAL
WBC # BLD: 1.9 K/UL — LOW (ref 3.8–10.5)
WBC # FLD AUTO: 1.9 K/UL — LOW (ref 3.8–10.5)

## 2017-06-28 ENCOUNTER — RESULT REVIEW (OUTPATIENT)
Age: 72
End: 2017-06-28

## 2017-06-28 ENCOUNTER — APPOINTMENT (OUTPATIENT)
Dept: INFUSION THERAPY | Facility: HOSPITAL | Age: 72
End: 2017-06-28

## 2017-06-28 LAB
BASOPHILS # BLD AUTO: 0 K/UL — SIGNIFICANT CHANGE UP (ref 0–0.2)
BASOPHILS NFR BLD AUTO: 1.2 % — SIGNIFICANT CHANGE UP (ref 0–2)
EOSINOPHIL # BLD AUTO: 0.3 K/UL — SIGNIFICANT CHANGE UP (ref 0–0.5)
EOSINOPHIL NFR BLD AUTO: 9.2 % — HIGH (ref 0–6)
HCT VFR BLD CALC: 32 % — LOW (ref 34.5–45)
HGB BLD-MCNC: 10.9 G/DL — LOW (ref 11.5–15.5)
LYMPHOCYTES # BLD AUTO: 0.8 K/UL — LOW (ref 1–3.3)
LYMPHOCYTES # BLD AUTO: 27.2 % — SIGNIFICANT CHANGE UP (ref 13–44)
MCHC RBC-ENTMCNC: 33.8 PG — SIGNIFICANT CHANGE UP (ref 27–34)
MCHC RBC-ENTMCNC: 34 G/DL — SIGNIFICANT CHANGE UP (ref 32–36)
MCV RBC AUTO: 99.4 FL — SIGNIFICANT CHANGE UP (ref 80–100)
MONOCYTES # BLD AUTO: 0.5 K/UL — SIGNIFICANT CHANGE UP (ref 0–0.9)
MONOCYTES NFR BLD AUTO: 16.6 % — HIGH (ref 2–14)
NEUTROPHILS # BLD AUTO: 1.4 K/UL — LOW (ref 1.8–7.4)
NEUTROPHILS NFR BLD AUTO: 46 % — SIGNIFICANT CHANGE UP (ref 43–77)
PLATELET # BLD AUTO: 153 K/UL — SIGNIFICANT CHANGE UP (ref 150–400)
RBC # BLD: 3.22 M/UL — LOW (ref 3.8–5.2)
RBC # FLD: 16.1 % — HIGH (ref 10.3–14.5)
WBC # BLD: 3.1 K/UL — LOW (ref 3.8–10.5)
WBC # FLD AUTO: 3.1 K/UL — LOW (ref 3.8–10.5)

## 2017-06-29 ENCOUNTER — OUTPATIENT (OUTPATIENT)
Dept: OUTPATIENT SERVICES | Facility: HOSPITAL | Age: 72
LOS: 1 days | Discharge: HOME | End: 2017-06-29

## 2017-06-29 DIAGNOSIS — R11.2 NAUSEA WITH VOMITING, UNSPECIFIED: ICD-10-CM

## 2017-06-29 DIAGNOSIS — Z51.11 ENCOUNTER FOR ANTINEOPLASTIC CHEMOTHERAPY: ICD-10-CM

## 2017-06-29 DIAGNOSIS — Z90.11 ACQUIRED ABSENCE OF RIGHT BREAST AND NIPPLE: Chronic | ICD-10-CM

## 2017-07-05 ENCOUNTER — RESULT REVIEW (OUTPATIENT)
Age: 72
End: 2017-07-05

## 2017-07-05 ENCOUNTER — APPOINTMENT (OUTPATIENT)
Dept: INFUSION THERAPY | Facility: HOSPITAL | Age: 72
End: 2017-07-05

## 2017-07-05 LAB
BASOPHILS # BLD AUTO: 0 K/UL — SIGNIFICANT CHANGE UP (ref 0–0.2)
BASOPHILS NFR BLD AUTO: 0.5 % — SIGNIFICANT CHANGE UP (ref 0–2)
EOSINOPHIL # BLD AUTO: 0.4 K/UL — SIGNIFICANT CHANGE UP (ref 0–0.5)
EOSINOPHIL NFR BLD AUTO: 10 % — HIGH (ref 0–6)
HCT VFR BLD CALC: 31.7 % — LOW (ref 34.5–45)
HGB BLD-MCNC: 10.8 G/DL — LOW (ref 11.5–15.5)
LYMPHOCYTES # BLD AUTO: 0.9 K/UL — LOW (ref 1–3.3)
LYMPHOCYTES # BLD AUTO: 24.3 % — SIGNIFICANT CHANGE UP (ref 13–44)
MCHC RBC-ENTMCNC: 33.9 PG — SIGNIFICANT CHANGE UP (ref 27–34)
MCHC RBC-ENTMCNC: 34.2 G/DL — SIGNIFICANT CHANGE UP (ref 32–36)
MCV RBC AUTO: 99.2 FL — SIGNIFICANT CHANGE UP (ref 80–100)
MONOCYTES # BLD AUTO: 0.2 K/UL — SIGNIFICANT CHANGE UP (ref 0–0.9)
MONOCYTES NFR BLD AUTO: 5.4 % — SIGNIFICANT CHANGE UP (ref 2–14)
NEUTROPHILS # BLD AUTO: 2.1 K/UL — SIGNIFICANT CHANGE UP (ref 1.8–7.4)
NEUTROPHILS NFR BLD AUTO: 59.8 % — SIGNIFICANT CHANGE UP (ref 43–77)
PLATELET # BLD AUTO: 93 K/UL — LOW (ref 150–400)
RBC # BLD: 3.19 M/UL — LOW (ref 3.8–5.2)
RBC # FLD: 15.5 % — HIGH (ref 10.3–14.5)
WBC # BLD: 3.6 K/UL — LOW (ref 3.8–10.5)
WBC # FLD AUTO: 3.6 K/UL — LOW (ref 3.8–10.5)

## 2017-07-07 ENCOUNTER — APPOINTMENT (OUTPATIENT)
Dept: RADIATION ONCOLOGY | Facility: CLINIC | Age: 72
End: 2017-07-07

## 2017-07-11 ENCOUNTER — CLINICAL ADVICE (OUTPATIENT)
Age: 72
End: 2017-07-11

## 2017-07-12 ENCOUNTER — OTHER (OUTPATIENT)
Age: 72
End: 2017-07-12

## 2017-07-13 ENCOUNTER — OUTPATIENT (OUTPATIENT)
Dept: OUTPATIENT SERVICES | Facility: HOSPITAL | Age: 72
LOS: 1 days | Discharge: HOME | End: 2017-07-13

## 2017-07-13 DIAGNOSIS — C50.911 MALIGNANT NEOPLASM OF UNSPECIFIED SITE OF RIGHT FEMALE BREAST: ICD-10-CM

## 2017-07-13 DIAGNOSIS — Z90.11 ACQUIRED ABSENCE OF RIGHT BREAST AND NIPPLE: Chronic | ICD-10-CM

## 2017-07-18 ENCOUNTER — RESULT REVIEW (OUTPATIENT)
Age: 72
End: 2017-07-18

## 2017-07-18 ENCOUNTER — APPOINTMENT (OUTPATIENT)
Dept: HEMATOLOGY ONCOLOGY | Facility: CLINIC | Age: 72
End: 2017-07-18

## 2017-07-18 VITALS
OXYGEN SATURATION: 96 % | BODY MASS INDEX: 29.18 KG/M2 | TEMPERATURE: 98.1 F | RESPIRATION RATE: 16 BRPM | DIASTOLIC BLOOD PRESSURE: 69 MMHG | HEART RATE: 73 BPM | SYSTOLIC BLOOD PRESSURE: 124 MMHG | WEIGHT: 180.78 LBS

## 2017-07-18 LAB
ALBUMIN SERPL ELPH-MCNC: 4.2 G/DL
ALP BLD-CCNC: 52 U/L
ALT SERPL-CCNC: 22 U/L
ANION GAP SERPL CALC-SCNC: 15 MMOL/L
ANISOCYTOSIS BLD QL: SLIGHT — SIGNIFICANT CHANGE UP
APTT BLD: 28.9 SEC
AST SERPL-CCNC: 24 U/L
BASOPHILS # BLD AUTO: 0 K/UL — SIGNIFICANT CHANGE UP (ref 0–0.2)
BILIRUB SERPL-MCNC: 0.2 MG/DL
BUN SERPL-MCNC: 20 MG/DL
CALCIUM SERPL-MCNC: 10 MG/DL
CHLORIDE SERPL-SCNC: 106 MMOL/L
CO2 SERPL-SCNC: 24 MMOL/L
CREAT SERPL-MCNC: 0.7 MG/DL
DACRYOCYTES BLD QL SMEAR: SLIGHT — SIGNIFICANT CHANGE UP
ELLIPTOCYTES BLD QL SMEAR: SLIGHT — SIGNIFICANT CHANGE UP
EOSINOPHIL # BLD AUTO: 0.2 K/UL — SIGNIFICANT CHANGE UP (ref 0–0.5)
EOSINOPHIL NFR BLD AUTO: 15 % — HIGH (ref 0–6)
GLUCOSE SERPL-MCNC: 101 MG/DL
HCT VFR BLD CALC: 33.4 % — LOW (ref 34.5–45)
HGB BLD-MCNC: 11.2 G/DL — LOW (ref 11.5–15.5)
HYPOCHROMIA BLD QL: SLIGHT — SIGNIFICANT CHANGE UP
INR PPP: 0.97 RATIO
LYMPHOCYTES # BLD AUTO: 0.6 K/UL — LOW (ref 1–3.3)
LYMPHOCYTES # BLD AUTO: 31 % — SIGNIFICANT CHANGE UP (ref 13–44)
MACROCYTES BLD QL: SLIGHT — SIGNIFICANT CHANGE UP
MCHC RBC-ENTMCNC: 32.8 PG — SIGNIFICANT CHANGE UP (ref 27–34)
MCHC RBC-ENTMCNC: 33.6 G/DL — SIGNIFICANT CHANGE UP (ref 32–36)
MCV RBC AUTO: 97.6 FL — SIGNIFICANT CHANGE UP (ref 80–100)
MICROCYTES BLD QL: SLIGHT — SIGNIFICANT CHANGE UP
MONOCYTES # BLD AUTO: 0.4 K/UL — SIGNIFICANT CHANGE UP (ref 0–0.9)
MONOCYTES NFR BLD AUTO: 21 % — HIGH (ref 2–14)
NEUTROPHILS # BLD AUTO: 0.7 K/UL — LOW (ref 1.8–7.4)
NEUTROPHILS NFR BLD AUTO: 33 % — LOW (ref 43–77)
PLAT MORPH BLD: NORMAL — SIGNIFICANT CHANGE UP
PLATELET # BLD AUTO: 187 K/UL — SIGNIFICANT CHANGE UP (ref 150–400)
POIKILOCYTOSIS BLD QL AUTO: SLIGHT — SIGNIFICANT CHANGE UP
POLYCHROMASIA BLD QL SMEAR: SLIGHT — SIGNIFICANT CHANGE UP
POTASSIUM SERPL-SCNC: 4.6 MMOL/L
PROT SERPL-MCNC: 7.4 G/DL
PT BLD: 11 SEC
RBC # BLD: 3.42 M/UL — LOW (ref 3.8–5.2)
RBC # FLD: 16.3 % — HIGH (ref 10.3–14.5)
RBC BLD AUTO: ABNORMAL
SODIUM SERPL-SCNC: 145 MMOL/L
WBC # BLD: 2 K/UL — LOW (ref 3.8–10.5)
WBC # FLD AUTO: 2 K/UL — LOW (ref 3.8–10.5)

## 2017-07-18 RX ORDER — ONDANSETRON 8 MG/1
8 TABLET ORAL
Qty: 42 | Refills: 6 | Status: DISCONTINUED | COMMUNITY
Start: 2017-01-30 | End: 2017-07-18

## 2017-07-18 RX ORDER — CYCLOPHOSPHAMIDE 50 MG/1
50 CAPSULE ORAL
Qty: 42 | Refills: 6 | Status: DISCONTINUED | COMMUNITY
Start: 2017-01-30 | End: 2017-07-18

## 2017-07-18 RX ORDER — GINSENG 100 MG
100 CAPSULE ORAL
Refills: 0 | Status: DISCONTINUED | COMMUNITY
Start: 2017-04-27 | End: 2017-07-18

## 2017-07-27 ENCOUNTER — RESULT REVIEW (OUTPATIENT)
Age: 72
End: 2017-07-27

## 2017-07-27 ENCOUNTER — APPOINTMENT (OUTPATIENT)
Dept: HEMATOLOGY ONCOLOGY | Facility: CLINIC | Age: 72
End: 2017-07-27

## 2017-07-27 LAB
BASOPHILS # BLD AUTO: 0 K/UL — SIGNIFICANT CHANGE UP (ref 0–0.2)
BASOPHILS NFR BLD AUTO: 0.8 % — SIGNIFICANT CHANGE UP (ref 0–2)
EOSINOPHIL # BLD AUTO: 0.2 K/UL — SIGNIFICANT CHANGE UP (ref 0–0.5)
EOSINOPHIL NFR BLD AUTO: 4.3 % — SIGNIFICANT CHANGE UP (ref 0–6)
HCT VFR BLD CALC: 34.2 % — LOW (ref 34.5–45)
HGB BLD-MCNC: 11.6 G/DL — SIGNIFICANT CHANGE UP (ref 11.5–15.5)
LYMPHOCYTES # BLD AUTO: 0.9 K/UL — LOW (ref 1–3.3)
LYMPHOCYTES # BLD AUTO: 23.4 % — SIGNIFICANT CHANGE UP (ref 13–44)
MCHC RBC-ENTMCNC: 33.3 PG — SIGNIFICANT CHANGE UP (ref 27–34)
MCHC RBC-ENTMCNC: 34 G/DL — SIGNIFICANT CHANGE UP (ref 32–36)
MCV RBC AUTO: 98 FL — SIGNIFICANT CHANGE UP (ref 80–100)
MONOCYTES # BLD AUTO: 0.5 K/UL — SIGNIFICANT CHANGE UP (ref 0–0.9)
MONOCYTES NFR BLD AUTO: 13.8 % — SIGNIFICANT CHANGE UP (ref 2–14)
NEUTROPHILS # BLD AUTO: 2.1 K/UL — SIGNIFICANT CHANGE UP (ref 1.8–7.4)
NEUTROPHILS NFR BLD AUTO: 57.7 % — SIGNIFICANT CHANGE UP (ref 43–77)
PLATELET # BLD AUTO: 154 K/UL — SIGNIFICANT CHANGE UP (ref 150–400)
RBC # BLD: 3.5 M/UL — LOW (ref 3.8–5.2)
RBC # FLD: 14.5 % — SIGNIFICANT CHANGE UP (ref 10.3–14.5)
WBC # BLD: 3.7 K/UL — LOW (ref 3.8–10.5)
WBC # FLD AUTO: 3.7 K/UL — LOW (ref 3.8–10.5)

## 2017-08-03 ENCOUNTER — FORM ENCOUNTER (OUTPATIENT)
Age: 72
End: 2017-08-03

## 2017-08-04 ENCOUNTER — OUTPATIENT (OUTPATIENT)
Dept: OUTPATIENT SERVICES | Facility: HOSPITAL | Age: 72
LOS: 1 days | End: 2017-08-04
Payer: MEDICARE

## 2017-08-04 DIAGNOSIS — Z90.11 ACQUIRED ABSENCE OF RIGHT BREAST AND NIPPLE: Chronic | ICD-10-CM

## 2017-08-04 DIAGNOSIS — C50.911 MALIGNANT NEOPLASM OF UNSPECIFIED SITE OF RIGHT FEMALE BREAST: ICD-10-CM

## 2017-08-04 PROCEDURE — 77001 FLUOROGUIDE FOR VEIN DEVICE: CPT | Mod: 26

## 2017-08-04 PROCEDURE — 36590 REMOVAL TUNNELED CV CATH: CPT

## 2017-08-04 PROCEDURE — 77001 FLUOROGUIDE FOR VEIN DEVICE: CPT

## 2017-08-09 DIAGNOSIS — Z45.2 ENCOUNTER FOR ADJUSTMENT AND MANAGEMENT OF VASCULAR ACCESS DEVICE: ICD-10-CM

## 2017-08-10 ENCOUNTER — OUTPATIENT (OUTPATIENT)
Dept: OUTPATIENT SERVICES | Facility: HOSPITAL | Age: 72
LOS: 1 days | Discharge: HOME | End: 2017-08-10

## 2017-08-10 DIAGNOSIS — C50.911 MALIGNANT NEOPLASM OF UNSPECIFIED SITE OF RIGHT FEMALE BREAST: ICD-10-CM

## 2017-08-10 DIAGNOSIS — Z90.11 ACQUIRED ABSENCE OF RIGHT BREAST AND NIPPLE: Chronic | ICD-10-CM

## 2017-08-15 ENCOUNTER — RESULT REVIEW (OUTPATIENT)
Age: 72
End: 2017-08-15

## 2017-08-15 ENCOUNTER — APPOINTMENT (OUTPATIENT)
Dept: HEMATOLOGY ONCOLOGY | Facility: CLINIC | Age: 72
End: 2017-08-15
Payer: MEDICARE

## 2017-08-15 VITALS
RESPIRATION RATE: 16 BRPM | WEIGHT: 186.29 LBS | BODY MASS INDEX: 30.07 KG/M2 | OXYGEN SATURATION: 96 % | TEMPERATURE: 97.9 F | DIASTOLIC BLOOD PRESSURE: 73 MMHG | SYSTOLIC BLOOD PRESSURE: 136 MMHG | HEART RATE: 75 BPM

## 2017-08-15 DIAGNOSIS — D70.1 AGRANULOCYTOSIS SECONDARY TO CANCER CHEMOTHERAPY: ICD-10-CM

## 2017-08-15 DIAGNOSIS — Z86.2 PERSONAL HISTORY OF DISEASES OF THE BLOOD AND BLOOD-FORMING ORGANS AND CERTAIN DISORDERS INVOLVING THE IMMUNE MECHANISM: ICD-10-CM

## 2017-08-15 DIAGNOSIS — D64.81 ANEMIA DUE TO ANTINEOPLASTIC CHEMOTHERAPY: ICD-10-CM

## 2017-08-15 DIAGNOSIS — L65.8 OTHER SPECIFIED NONSCARRING HAIR LOSS: ICD-10-CM

## 2017-08-15 DIAGNOSIS — Z45.2 ENCOUNTER FOR ADJUSTMENT AND MANAGEMENT OF VASCULAR ACCESS DEVICE: ICD-10-CM

## 2017-08-15 LAB
HCT VFR BLD CALC: 34.6 % — SIGNIFICANT CHANGE UP (ref 34.5–45)
HGB BLD-MCNC: 11.6 G/DL — SIGNIFICANT CHANGE UP (ref 11.5–15.5)
MCHC RBC-ENTMCNC: 32.4 PG — SIGNIFICANT CHANGE UP (ref 27–34)
MCHC RBC-ENTMCNC: 33.5 G/DL — SIGNIFICANT CHANGE UP (ref 32–36)
MCV RBC AUTO: 96.8 FL — SIGNIFICANT CHANGE UP (ref 80–100)
PLATELET # BLD AUTO: 200 K/UL — SIGNIFICANT CHANGE UP (ref 150–400)
RBC # BLD: 3.57 M/UL — LOW (ref 3.8–5.2)
RBC # FLD: 13.8 % — SIGNIFICANT CHANGE UP (ref 10.3–14.5)
WBC # BLD: 4.4 K/UL — SIGNIFICANT CHANGE UP (ref 3.8–10.5)
WBC # FLD AUTO: 4.4 K/UL — SIGNIFICANT CHANGE UP (ref 3.8–10.5)

## 2017-08-15 PROCEDURE — 99214 OFFICE O/P EST MOD 30 MIN: CPT

## 2017-08-15 RX ORDER — LIDOCAINE AND PRILOCAINE 25; 25 MG/G; MG/G
2.5-2.5 CREAM TOPICAL
Qty: 1 | Refills: 1 | Status: DISCONTINUED | COMMUNITY
Start: 2017-01-30 | End: 2017-08-15

## 2017-08-15 RX ORDER — PREDNISOLONE ACETATE 10 MG/ML
1 SUSPENSION/ DROPS OPHTHALMIC
Refills: 0 | Status: DISCONTINUED | COMMUNITY
Start: 2017-07-18 | End: 2017-08-15

## 2017-09-06 DIAGNOSIS — Z90.11 ACQUIRED ABSENCE OF RIGHT BREAST AND NIPPLE: ICD-10-CM

## 2017-10-12 ENCOUNTER — OTHER (OUTPATIENT)
Age: 72
End: 2017-10-12

## 2017-10-13 ENCOUNTER — APPOINTMENT (OUTPATIENT)
Dept: RADIATION ONCOLOGY | Facility: CLINIC | Age: 72
End: 2017-10-13
Payer: MEDICARE

## 2017-10-13 ENCOUNTER — RX RENEWAL (OUTPATIENT)
Age: 72
End: 2017-10-13

## 2017-10-13 PROCEDURE — 99024 POSTOP FOLLOW-UP VISIT: CPT

## 2017-11-06 ENCOUNTER — OUTPATIENT (OUTPATIENT)
Dept: OUTPATIENT SERVICES | Facility: HOSPITAL | Age: 72
LOS: 1 days | Discharge: HOME | End: 2017-11-06

## 2017-11-06 DIAGNOSIS — Z90.11 ACQUIRED ABSENCE OF RIGHT BREAST AND NIPPLE: Chronic | ICD-10-CM

## 2017-11-06 DIAGNOSIS — C50.911 MALIGNANT NEOPLASM OF UNSPECIFIED SITE OF RIGHT FEMALE BREAST: ICD-10-CM

## 2017-11-08 ENCOUNTER — APPOINTMENT (OUTPATIENT)
Dept: HEMATOLOGY ONCOLOGY | Facility: CLINIC | Age: 72
End: 2017-11-08
Payer: MEDICARE

## 2017-11-08 ENCOUNTER — RESULT REVIEW (OUTPATIENT)
Age: 72
End: 2017-11-08

## 2017-11-08 VITALS
RESPIRATION RATE: 16 BRPM | HEART RATE: 85 BPM | OXYGEN SATURATION: 96 % | SYSTOLIC BLOOD PRESSURE: 152 MMHG | DIASTOLIC BLOOD PRESSURE: 71 MMHG | TEMPERATURE: 98.1 F

## 2017-11-08 LAB
ALBUMIN SERPL ELPH-MCNC: 4.4 G/DL
ALP BLD-CCNC: 57 U/L
ALT SERPL-CCNC: 23 U/L
ANION GAP SERPL CALC-SCNC: 16 MMOL/L
AST SERPL-CCNC: 22 U/L
BILIRUB SERPL-MCNC: 0.2 MG/DL
BUN SERPL-MCNC: 25 MG/DL
CALCIUM SERPL-MCNC: 10.1 MG/DL
CHLORIDE SERPL-SCNC: 108 MMOL/L
CO2 SERPL-SCNC: 22 MMOL/L
CREAT SERPL-MCNC: 0.75 MG/DL
GLUCOSE SERPL-MCNC: 99 MG/DL
HCT VFR BLD CALC: 34.2 % — LOW (ref 34.5–45)
HGB BLD-MCNC: 11.4 G/DL — LOW (ref 11.5–15.5)
MCHC RBC-ENTMCNC: 29.8 PG — SIGNIFICANT CHANGE UP (ref 27–34)
MCHC RBC-ENTMCNC: 33.4 G/DL — SIGNIFICANT CHANGE UP (ref 32–36)
MCV RBC AUTO: 89.2 FL — SIGNIFICANT CHANGE UP (ref 80–100)
PLATELET # BLD AUTO: 211 K/UL — SIGNIFICANT CHANGE UP (ref 150–400)
POTASSIUM SERPL-SCNC: 4.8 MMOL/L
PROT SERPL-MCNC: 7.1 G/DL
RBC # BLD: 3.83 M/UL — SIGNIFICANT CHANGE UP (ref 3.8–5.2)
RBC # FLD: 13.3 % — SIGNIFICANT CHANGE UP (ref 10.3–14.5)
SODIUM SERPL-SCNC: 146 MMOL/L
WBC # BLD: 4.1 K/UL — SIGNIFICANT CHANGE UP (ref 3.8–10.5)
WBC # FLD AUTO: 4.1 K/UL — SIGNIFICANT CHANGE UP (ref 3.8–10.5)

## 2017-11-08 PROCEDURE — 99214 OFFICE O/P EST MOD 30 MIN: CPT

## 2017-11-15 ENCOUNTER — APPOINTMENT (OUTPATIENT)
Dept: OBGYN | Facility: CLINIC | Age: 72
End: 2017-11-15
Payer: MEDICARE

## 2017-11-15 VITALS — DIASTOLIC BLOOD PRESSURE: 58 MMHG | SYSTOLIC BLOOD PRESSURE: 152 MMHG | HEIGHT: 66 IN

## 2017-11-15 PROCEDURE — G0101: CPT

## 2017-11-21 LAB — CYTOLOGY CVX/VAG DOC THIN PREP: NORMAL

## 2018-02-01 ENCOUNTER — OUTPATIENT (OUTPATIENT)
Dept: OUTPATIENT SERVICES | Facility: HOSPITAL | Age: 73
LOS: 1 days | Discharge: HOME | End: 2018-02-01

## 2018-02-01 DIAGNOSIS — Z90.11 ACQUIRED ABSENCE OF RIGHT BREAST AND NIPPLE: Chronic | ICD-10-CM

## 2018-02-01 DIAGNOSIS — C50.911 MALIGNANT NEOPLASM OF UNSPECIFIED SITE OF RIGHT FEMALE BREAST: ICD-10-CM

## 2018-02-06 ENCOUNTER — APPOINTMENT (OUTPATIENT)
Dept: HEMATOLOGY ONCOLOGY | Facility: CLINIC | Age: 73
End: 2018-02-06
Payer: MEDICARE

## 2018-02-06 ENCOUNTER — RESULT REVIEW (OUTPATIENT)
Age: 73
End: 2018-02-06

## 2018-02-06 VITALS
TEMPERATURE: 98.2 F | HEART RATE: 72 BPM | OXYGEN SATURATION: 99 % | DIASTOLIC BLOOD PRESSURE: 80 MMHG | RESPIRATION RATE: 16 BRPM | SYSTOLIC BLOOD PRESSURE: 142 MMHG

## 2018-02-06 DIAGNOSIS — Z87.09 PERSONAL HISTORY OF OTHER DISEASES OF THE RESPIRATORY SYSTEM: ICD-10-CM

## 2018-02-06 DIAGNOSIS — Z86.2 PERSONAL HISTORY OF DISEASES OF THE BLOOD AND BLOOD-FORMING ORGANS AND CERTAIN DISORDERS INVOLVING THE IMMUNE MECHANISM: ICD-10-CM

## 2018-02-06 DIAGNOSIS — N64.89 OTHER SPECIFIED DISORDERS OF BREAST: ICD-10-CM

## 2018-02-06 LAB
HCT VFR BLD CALC: 35.7 % — SIGNIFICANT CHANGE UP (ref 34.5–45)
HGB BLD-MCNC: 11.9 G/DL — SIGNIFICANT CHANGE UP (ref 11.5–15.5)
MCHC RBC-ENTMCNC: 28.8 PG — SIGNIFICANT CHANGE UP (ref 27–34)
MCHC RBC-ENTMCNC: 33.2 G/DL — SIGNIFICANT CHANGE UP (ref 32–36)
MCV RBC AUTO: 86.7 FL — SIGNIFICANT CHANGE UP (ref 80–100)
PLATELET # BLD AUTO: 207 K/UL — SIGNIFICANT CHANGE UP (ref 150–400)
RBC # BLD: 4.12 M/UL — SIGNIFICANT CHANGE UP (ref 3.8–5.2)
RBC # FLD: 13.7 % — SIGNIFICANT CHANGE UP (ref 10.3–14.5)
WBC # BLD: 5.2 K/UL — SIGNIFICANT CHANGE UP (ref 3.8–10.5)
WBC # FLD AUTO: 5.2 K/UL — SIGNIFICANT CHANGE UP (ref 3.8–10.5)

## 2018-02-06 PROCEDURE — 99214 OFFICE O/P EST MOD 30 MIN: CPT

## 2018-02-06 RX ORDER — CYCLOPHOSPHAMIDE 50 MG
TABLET ORAL
Refills: 0 | Status: DISCONTINUED | COMMUNITY
End: 2018-02-06

## 2018-02-08 LAB
ALBUMIN SERPL ELPH-MCNC: 4.2 G/DL
ALP BLD-CCNC: 55 U/L
ALT SERPL-CCNC: 18 U/L
ANION GAP SERPL CALC-SCNC: 13 MMOL/L
AST SERPL-CCNC: 21 U/L
BILIRUB SERPL-MCNC: <0.2 MG/DL
BUN SERPL-MCNC: 32 MG/DL
CALCIUM SERPL-MCNC: 9.4 MG/DL
CHLORIDE SERPL-SCNC: 107 MMOL/L
CO2 SERPL-SCNC: 24 MMOL/L
CREAT SERPL-MCNC: 0.73 MG/DL
GLUCOSE SERPL-MCNC: 82 MG/DL
POTASSIUM SERPL-SCNC: 4.5 MMOL/L
PROT SERPL-MCNC: 7.2 G/DL
SODIUM SERPL-SCNC: 144 MMOL/L

## 2018-05-08 ENCOUNTER — OUTPATIENT (OUTPATIENT)
Dept: OUTPATIENT SERVICES | Facility: HOSPITAL | Age: 73
LOS: 1 days | Discharge: HOME | End: 2018-05-08

## 2018-05-08 DIAGNOSIS — Z90.11 ACQUIRED ABSENCE OF RIGHT BREAST AND NIPPLE: Chronic | ICD-10-CM

## 2018-05-08 DIAGNOSIS — C50.911 MALIGNANT NEOPLASM OF UNSPECIFIED SITE OF RIGHT FEMALE BREAST: ICD-10-CM

## 2018-05-10 ENCOUNTER — RESULT REVIEW (OUTPATIENT)
Age: 73
End: 2018-05-10

## 2018-05-10 ENCOUNTER — APPOINTMENT (OUTPATIENT)
Dept: HEMATOLOGY ONCOLOGY | Facility: CLINIC | Age: 73
End: 2018-05-10
Payer: MEDICARE

## 2018-05-10 VITALS
HEART RATE: 78 BPM | TEMPERATURE: 98.2 F | RESPIRATION RATE: 16 BRPM | SYSTOLIC BLOOD PRESSURE: 147 MMHG | DIASTOLIC BLOOD PRESSURE: 76 MMHG | OXYGEN SATURATION: 95 %

## 2018-05-10 DIAGNOSIS — R53.83 OTHER FATIGUE: ICD-10-CM

## 2018-05-10 DIAGNOSIS — E55.9 VITAMIN D DEFICIENCY, UNSPECIFIED: ICD-10-CM

## 2018-05-10 DIAGNOSIS — K13.0 DISEASES OF LIPS: ICD-10-CM

## 2018-05-10 LAB
BASOPHILS # BLD AUTO: 0 K/UL — SIGNIFICANT CHANGE UP (ref 0–0.2)
BASOPHILS NFR BLD AUTO: 0.6 % — SIGNIFICANT CHANGE UP (ref 0–2)
EOSINOPHIL # BLD AUTO: 0.2 K/UL — SIGNIFICANT CHANGE UP (ref 0–0.5)
EOSINOPHIL NFR BLD AUTO: 3.7 % — SIGNIFICANT CHANGE UP (ref 0–6)
HCT VFR BLD CALC: 34.9 % — SIGNIFICANT CHANGE UP (ref 34.5–45)
HGB BLD-MCNC: 11.5 G/DL — SIGNIFICANT CHANGE UP (ref 11.5–15.5)
LYMPHOCYTES # BLD AUTO: 1.1 K/UL — SIGNIFICANT CHANGE UP (ref 1–3.3)
LYMPHOCYTES # BLD AUTO: 23.1 % — SIGNIFICANT CHANGE UP (ref 13–44)
MCHC RBC-ENTMCNC: 28.1 PG — SIGNIFICANT CHANGE UP (ref 27–34)
MCHC RBC-ENTMCNC: 33 G/DL — SIGNIFICANT CHANGE UP (ref 32–36)
MCV RBC AUTO: 85.1 FL — SIGNIFICANT CHANGE UP (ref 80–100)
MONOCYTES # BLD AUTO: 0.6 K/UL — SIGNIFICANT CHANGE UP (ref 0–0.9)
MONOCYTES NFR BLD AUTO: 12.6 % — SIGNIFICANT CHANGE UP (ref 2–14)
NEUTROPHILS # BLD AUTO: 2.8 K/UL — SIGNIFICANT CHANGE UP (ref 1.8–7.4)
NEUTROPHILS NFR BLD AUTO: 60 % — SIGNIFICANT CHANGE UP (ref 43–77)
PLATELET # BLD AUTO: 205 K/UL — SIGNIFICANT CHANGE UP (ref 150–400)
RBC # BLD: 4.1 M/UL — SIGNIFICANT CHANGE UP (ref 3.8–5.2)
RBC # FLD: 14.1 % — SIGNIFICANT CHANGE UP (ref 10.3–14.5)
WBC # BLD: 4.7 K/UL — SIGNIFICANT CHANGE UP (ref 3.8–10.5)
WBC # FLD AUTO: 4.7 K/UL — SIGNIFICANT CHANGE UP (ref 3.8–10.5)

## 2018-05-10 PROCEDURE — 99213 OFFICE O/P EST LOW 20 MIN: CPT

## 2018-05-13 LAB
25(OH)D3 SERPL-MCNC: 21.9 NG/ML
ALBUMIN SERPL ELPH-MCNC: 4 G/DL
ALP BLD-CCNC: 60 U/L
ALT SERPL-CCNC: 18 U/L
ANION GAP SERPL CALC-SCNC: 11 MMOL/L
AST SERPL-CCNC: 20 U/L
BILIRUB SERPL-MCNC: 0.2 MG/DL
BUN SERPL-MCNC: 29 MG/DL
CALCIUM SERPL-MCNC: 9.4 MG/DL
CHLORIDE SERPL-SCNC: 106 MMOL/L
CHOLEST SERPL-MCNC: 189 MG/DL
CHOLEST/HDLC SERPL: 5 RATIO
CO2 SERPL-SCNC: 25 MMOL/L
CREAT SERPL-MCNC: 0.76 MG/DL
GLUCOSE SERPL-MCNC: 86 MG/DL
HBA1C MFR BLD HPLC: 6.1 %
HDLC SERPL-MCNC: 38 MG/DL
LDLC SERPL CALC-MCNC: 116 MG/DL
POTASSIUM SERPL-SCNC: 4.5 MMOL/L
PROT SERPL-MCNC: 6.9 G/DL
SODIUM SERPL-SCNC: 142 MMOL/L
TRIGL SERPL-MCNC: 174 MG/DL
TSH SERPL-ACNC: 2.3 UIU/ML

## 2018-07-25 PROBLEM — Z13.71 BRCA NEGATIVE: Status: ACTIVE | Noted: 2017-01-10

## 2018-07-27 ENCOUNTER — OUTPATIENT (OUTPATIENT)
Dept: OUTPATIENT SERVICES | Facility: HOSPITAL | Age: 73
LOS: 1 days | Discharge: HOME | End: 2018-07-27

## 2018-07-27 DIAGNOSIS — Z90.11 ACQUIRED ABSENCE OF RIGHT BREAST AND NIPPLE: Chronic | ICD-10-CM

## 2018-07-27 DIAGNOSIS — C50.911 MALIGNANT NEOPLASM OF UNSPECIFIED SITE OF RIGHT FEMALE BREAST: ICD-10-CM

## 2018-08-07 ENCOUNTER — RESULT REVIEW (OUTPATIENT)
Age: 73
End: 2018-08-07

## 2018-08-07 ENCOUNTER — APPOINTMENT (OUTPATIENT)
Dept: HEMATOLOGY ONCOLOGY | Facility: CLINIC | Age: 73
End: 2018-08-07
Payer: MEDICARE

## 2018-08-07 VITALS
SYSTOLIC BLOOD PRESSURE: 130 MMHG | RESPIRATION RATE: 18 BRPM | HEART RATE: 78 BPM | OXYGEN SATURATION: 96 % | DIASTOLIC BLOOD PRESSURE: 80 MMHG | TEMPERATURE: 98.4 F

## 2018-08-07 LAB
HCT VFR BLD CALC: 35.8 % — SIGNIFICANT CHANGE UP (ref 34.5–45)
HGB BLD-MCNC: 11.6 G/DL — SIGNIFICANT CHANGE UP (ref 11.5–15.5)
MCHC RBC-ENTMCNC: 27.5 PG — SIGNIFICANT CHANGE UP (ref 27–34)
MCHC RBC-ENTMCNC: 32.5 G/DL — SIGNIFICANT CHANGE UP (ref 32–36)
MCV RBC AUTO: 84.8 FL — SIGNIFICANT CHANGE UP (ref 80–100)
PLATELET # BLD AUTO: 179 K/UL — SIGNIFICANT CHANGE UP (ref 150–400)
RBC # BLD: 4.22 M/UL — SIGNIFICANT CHANGE UP (ref 3.8–5.2)
RBC # FLD: 14.3 % — SIGNIFICANT CHANGE UP (ref 10.3–14.5)
WBC # BLD: 5.1 K/UL — SIGNIFICANT CHANGE UP (ref 3.8–10.5)
WBC # FLD AUTO: 5.1 K/UL — SIGNIFICANT CHANGE UP (ref 3.8–10.5)

## 2018-08-07 PROCEDURE — 99213 OFFICE O/P EST LOW 20 MIN: CPT

## 2018-08-17 LAB
ALBUMIN SERPL ELPH-MCNC: 4.2 G/DL
ALP BLD-CCNC: 55 U/L
ALT SERPL-CCNC: 13 U/L
ANION GAP SERPL CALC-SCNC: 14 MMOL/L
AST SERPL-CCNC: 17 U/L
BILIRUB SERPL-MCNC: <0.2 MG/DL
BUN SERPL-MCNC: 24 MG/DL
CALCIUM SERPL-MCNC: 8.8 MG/DL
CHLORIDE SERPL-SCNC: 105 MMOL/L
CO2 SERPL-SCNC: 20 MMOL/L
CREAT SERPL-MCNC: 0.74 MG/DL
GLUCOSE SERPL-MCNC: 102 MG/DL
POTASSIUM SERPL-SCNC: 4.4 MMOL/L
PROT SERPL-MCNC: 6.9 G/DL
SODIUM SERPL-SCNC: 139 MMOL/L

## 2018-10-04 ENCOUNTER — RX RENEWAL (OUTPATIENT)
Age: 73
End: 2018-10-04

## 2018-11-19 ENCOUNTER — MEDICATION RENEWAL (OUTPATIENT)
Age: 73
End: 2018-11-19

## 2018-11-19 ENCOUNTER — APPOINTMENT (OUTPATIENT)
Dept: OBGYN | Facility: CLINIC | Age: 73
End: 2018-11-19
Payer: MEDICARE

## 2018-11-19 VITALS — HEIGHT: 66 IN | SYSTOLIC BLOOD PRESSURE: 140 MMHG | DIASTOLIC BLOOD PRESSURE: 74 MMHG

## 2018-11-19 DIAGNOSIS — Z85.3 PERSONAL HISTORY OF MALIGNANT NEOPLASM OF BREAST: ICD-10-CM

## 2018-11-19 DIAGNOSIS — Z01.419 ENCOUNTER FOR GYNECOLOGICAL EXAMINATION (GENERAL) (ROUTINE) W/OUT ABNORMAL FINDINGS: ICD-10-CM

## 2018-11-19 PROCEDURE — 99213 OFFICE O/P EST LOW 20 MIN: CPT

## 2018-11-19 RX ORDER — DIAZEPAM 5 MG/1
5 TABLET ORAL
Refills: 0 | Status: DISCONTINUED | COMMUNITY
Start: 2017-01-10 | End: 2018-11-19

## 2018-11-23 LAB — CYTOLOGY CVX/VAG DOC THIN PREP: NORMAL

## 2018-12-10 ENCOUNTER — APPOINTMENT (OUTPATIENT)
Dept: UROLOGY | Facility: CLINIC | Age: 73
End: 2018-12-10
Payer: MEDICARE

## 2018-12-10 DIAGNOSIS — R35.0 FREQUENCY OF MICTURITION: ICD-10-CM

## 2018-12-10 PROCEDURE — 99213 OFFICE O/P EST LOW 20 MIN: CPT

## 2018-12-11 LAB
APPEARANCE: CLEAR
BACTERIA: NEGATIVE
BILIRUBIN URINE: NEGATIVE
BLOOD URINE: NEGATIVE
COLOR: YELLOW
CORE LAB FLUID CYTOLOGY: NORMAL
GLUCOSE QUALITATIVE U: NEGATIVE MG/DL
HYALINE CASTS: 1 /LPF
KETONES URINE: NEGATIVE
LEUKOCYTE ESTERASE URINE: ABNORMAL
MICROSCOPIC-UA: NORMAL
NITRITE URINE: NEGATIVE
PH URINE: 5.5
PROTEIN URINE: NEGATIVE MG/DL
RED BLOOD CELLS URINE: 2 /HPF
SPECIFIC GRAVITY URINE: 1.02
SQUAMOUS EPITHELIAL CELLS: 2 /HPF
UROBILINOGEN URINE: NEGATIVE MG/DL
WHITE BLOOD CELLS URINE: 3 /HPF

## 2018-12-12 LAB — BACTERIA UR CULT: NORMAL

## 2019-01-19 ENCOUNTER — TRANSCRIPTION ENCOUNTER (OUTPATIENT)
Age: 74
End: 2019-01-19

## 2019-02-06 ENCOUNTER — OUTPATIENT (OUTPATIENT)
Dept: OUTPATIENT SERVICES | Facility: HOSPITAL | Age: 74
LOS: 1 days | Discharge: HOME | End: 2019-02-06

## 2019-02-06 DIAGNOSIS — Z90.11 ACQUIRED ABSENCE OF RIGHT BREAST AND NIPPLE: Chronic | ICD-10-CM

## 2019-02-06 DIAGNOSIS — C50.911 MALIGNANT NEOPLASM OF UNSPECIFIED SITE OF RIGHT FEMALE BREAST: ICD-10-CM

## 2019-02-19 ENCOUNTER — RESULT REVIEW (OUTPATIENT)
Age: 74
End: 2019-02-19

## 2019-02-19 ENCOUNTER — APPOINTMENT (OUTPATIENT)
Dept: HEMATOLOGY ONCOLOGY | Facility: CLINIC | Age: 74
End: 2019-02-19
Payer: MEDICARE

## 2019-02-19 VITALS
HEART RATE: 95 BPM | DIASTOLIC BLOOD PRESSURE: 73 MMHG | SYSTOLIC BLOOD PRESSURE: 159 MMHG | OXYGEN SATURATION: 98 % | RESPIRATION RATE: 16 BRPM | TEMPERATURE: 98.1 F

## 2019-02-19 LAB
HCT VFR BLD CALC: 35.3 % — SIGNIFICANT CHANGE UP (ref 34.5–45)
HGB BLD-MCNC: 11.9 G/DL — SIGNIFICANT CHANGE UP (ref 11.5–15.5)
MCHC RBC-ENTMCNC: 28.2 PG — SIGNIFICANT CHANGE UP (ref 27–34)
MCHC RBC-ENTMCNC: 33.8 G/DL — SIGNIFICANT CHANGE UP (ref 32–36)
MCV RBC AUTO: 83.4 FL — SIGNIFICANT CHANGE UP (ref 80–100)
PLATELET # BLD AUTO: 256 K/UL — SIGNIFICANT CHANGE UP (ref 150–400)
RBC # BLD: 4.23 M/UL — SIGNIFICANT CHANGE UP (ref 3.8–5.2)
RBC # FLD: 14.9 % — HIGH (ref 10.3–14.5)
WBC # BLD: 6.6 K/UL — SIGNIFICANT CHANGE UP (ref 3.8–10.5)
WBC # FLD AUTO: 6.6 K/UL — SIGNIFICANT CHANGE UP (ref 3.8–10.5)

## 2019-02-19 PROCEDURE — 99213 OFFICE O/P EST LOW 20 MIN: CPT

## 2019-02-19 NOTE — HISTORY OF PRESENT ILLNESS
[Disease: _____________________] : Disease: [unfilled] [T: ___] : T[unfilled] [N: ___] : N[unfilled] [M: ___] : M[unfilled] [AJCC Stage: ____] : AJCC Stage: [unfilled] [Therapy: ___] : Therapy: [unfilled] [de-identified] : The patient presented in October 2016, at age 71, with an abnormal screening mammogram of the right breast.\par \par After her initial screening mammogram on October 24, 2016 demonstrated a new nodular density in the right lower inner breast, call back right diagnostic mammogram and targeted right ultrasound were performed on November 2, 2016. Radiologic studies demonstrated a 4 mm nodular density which corresponded to a solid nodule on breast ultrasound. Ultrasound-guided core biopsy performed on November 9, 2016 was positive for  infiltrating ductal carcinoma , Terence score 6/9 along with DCIS. The infiltrating carcinoma was estrogen receptor negative, progesterone receptor negative, and HER-2 negative by IHC and by FISH. Subsequent review of the core biopsy at [facility] measured  the tumor as being at least 7 mm on core biopsy.\par \par Breast MRI at [facility] on December 1, 2016 demonstrated a 1.3 cm area of enhancement surrounding the biopsy clip in the right breast and probably benign nonspecific enhancement in other areas of the right breast for which a six-month followup MRI was suggested. The left breast was negative.\par \par On December 16, 2016 Dr. Miracle Ng performed a lumpectomy and sentinel lymph node biopsy. Pathology from that procedure demonstrated 0.4 cm  residual focus of infiltrating ductal carcinoma, Terence score 6/9, which was also ER negative, [place] negative, and HER-2/markell negative. There was no DCIS within the specimen. There were 2 negative sentinel lymph nodes.\par \par The patient's postoperative course was uneventful until the appearance of swelling and purple discoloration of the right breast over the past few days and ecchymosis of the left breast.\par \par The patient's gynecologist performed a Multisite 3 BRACAnalysis on November 14, 2016 which was negative. [de-identified] : Infiltrating ductal carcinoma, Terence score 6/9, ER negative, [place] negative, HER-2/markell negative by IHC and FISH, Ki-67 50% [FreeTextEntry1] : CMF (oral Cytoxan) start 2/1/2017 - 7/11/2017.Received 86% of planned dose of Cytoxan and 92% a planned dose of methotrexate/5-FU. [de-identified] : The patient is 2 year 3 months post diagnosis of breast cancer.\par \par Completed chemotherapy 1 year 7 months ago.\par \par Bilateral Mammogram/breast US 11/5/2018:  heterogenously dense breasts, new 4 mm nodule on US, biopsy advised.\par \par US guided biopsy eft breast  11/5/2018 benign dense stroma with fibrosis, 1 year follow up advised.\par \par Last saw surgeon Dr Ng 12/2018, exam OK.\par \par Has ongoing URI x 1 month, slowly improving.\par \par No other interval events.\par

## 2019-02-19 NOTE — REASON FOR VISIT
[Follow-Up Visit] : a follow-up [Urgent Visit] : an urgent  [FreeTextEntry2] : Triple  negative IDC right breast

## 2019-02-19 NOTE — PHYSICAL EXAM
[Fully active, able to carry on all pre-disease performance without restriction] : Status 0 - Fully active, able to carry on all pre-disease performance without restriction [Normal] : grossly intact [de-identified] : Asymmetry, right breast larger than left. Right breast:; scars LIQ   and axilla.Left breast: no mass.  [de-identified] : Hair regrowth.

## 2019-02-19 NOTE — REVIEW OF SYSTEMS
[Insomnia] : insomnia [Negative] : Allergic/Immunologic [Cough] : cough [FreeTextEntry2] : Energy level  normal. S/P flu  vaccination 10/2018 and pneumovax 12/2018. Had both doses of Shingrix 2018. [FreeTextEntry3] : Saw ophthalmologist April 2018, exam OK [FreeTextEntry5] : Occasional palpitations. [FreeTextEntry6] : Cough secondary to URI [FreeTextEntry7] : Most recent colonoscopy 2014, next f/u 10/2019. Appetite is good. [FreeTextEntry8] : Most recent GYN exam 11/2018.. No vaginal bleeding or spotting. [FreeTextEntry9] : Most recent BMD 10/25/2017,normal. [de-identified] : Difficulty falling asleep, take Tylenol pm PRN [de-identified] : Unchanged hot flashes.

## 2019-02-20 LAB
ALBUMIN SERPL ELPH-MCNC: 4.2 G/DL
ALP BLD-CCNC: 61 U/L
ALT SERPL-CCNC: 16 U/L
ANION GAP SERPL CALC-SCNC: 13 MMOL/L
AST SERPL-CCNC: 18 U/L
BILIRUB SERPL-MCNC: <0.2 MG/DL
BUN SERPL-MCNC: 26 MG/DL
CALCIUM SERPL-MCNC: 9.8 MG/DL
CHLORIDE SERPL-SCNC: 103 MMOL/L
CO2 SERPL-SCNC: 26 MMOL/L
CREAT SERPL-MCNC: 0.74 MG/DL
GLUCOSE SERPL-MCNC: 108 MG/DL
POTASSIUM SERPL-SCNC: 4.4 MMOL/L
PROT SERPL-MCNC: 7.3 G/DL
SODIUM SERPL-SCNC: 141 MMOL/L

## 2019-08-12 ENCOUNTER — OUTPATIENT (OUTPATIENT)
Dept: OUTPATIENT SERVICES | Facility: HOSPITAL | Age: 74
LOS: 1 days | Discharge: HOME | End: 2019-08-12

## 2019-08-12 DIAGNOSIS — C50.911 MALIGNANT NEOPLASM OF UNSPECIFIED SITE OF RIGHT FEMALE BREAST: ICD-10-CM

## 2019-08-12 DIAGNOSIS — Z90.11 ACQUIRED ABSENCE OF RIGHT BREAST AND NIPPLE: Chronic | ICD-10-CM

## 2019-08-13 ENCOUNTER — RESULT REVIEW (OUTPATIENT)
Age: 74
End: 2019-08-13

## 2019-08-13 ENCOUNTER — APPOINTMENT (OUTPATIENT)
Dept: HEMATOLOGY ONCOLOGY | Facility: CLINIC | Age: 74
End: 2019-08-13
Payer: MEDICARE

## 2019-08-13 VITALS
OXYGEN SATURATION: 98 % | RESPIRATION RATE: 16 BRPM | DIASTOLIC BLOOD PRESSURE: 77 MMHG | SYSTOLIC BLOOD PRESSURE: 133 MMHG | HEART RATE: 75 BPM | TEMPERATURE: 99.1 F

## 2019-08-13 LAB
HCT VFR BLD CALC: 36.9 % — SIGNIFICANT CHANGE UP (ref 34.5–45)
HGB BLD-MCNC: 12.7 G/DL — SIGNIFICANT CHANGE UP (ref 11.5–15.5)
MCHC RBC-ENTMCNC: 29.8 PG — SIGNIFICANT CHANGE UP (ref 27–34)
MCHC RBC-ENTMCNC: 34.3 G/DL — SIGNIFICANT CHANGE UP (ref 32–36)
MCV RBC AUTO: 86.8 FL — SIGNIFICANT CHANGE UP (ref 80–100)
PLATELET # BLD AUTO: 225 K/UL — SIGNIFICANT CHANGE UP (ref 150–400)
RBC # BLD: 4.25 M/UL — SIGNIFICANT CHANGE UP (ref 3.8–5.2)
RBC # FLD: 14.6 % — HIGH (ref 10.3–14.5)
WBC # BLD: 4.9 K/UL — SIGNIFICANT CHANGE UP (ref 3.8–10.5)
WBC # FLD AUTO: 4.9 K/UL — SIGNIFICANT CHANGE UP (ref 3.8–10.5)

## 2019-08-13 PROCEDURE — 99213 OFFICE O/P EST LOW 20 MIN: CPT

## 2019-08-14 LAB
25(OH)D3 SERPL-MCNC: 23.9 NG/ML
ALBUMIN SERPL ELPH-MCNC: 4.3 G/DL
ALP BLD-CCNC: 55 U/L
ALT SERPL-CCNC: 19 U/L
ANION GAP SERPL CALC-SCNC: 14 MMOL/L
AST SERPL-CCNC: 18 U/L
BILIRUB SERPL-MCNC: <0.2 MG/DL
BUN SERPL-MCNC: 25 MG/DL
CALCIUM SERPL-MCNC: 9.3 MG/DL
CHLORIDE SERPL-SCNC: 105 MMOL/L
CHOLEST SERPL-MCNC: 187 MG/DL
CHOLEST/HDLC SERPL: 5.2 RATIO
CO2 SERPL-SCNC: 22 MMOL/L
CREAT SERPL-MCNC: 0.75 MG/DL
GLUCOSE SERPL-MCNC: 104 MG/DL
HDLC SERPL-MCNC: 36 MG/DL
LDLC SERPL CALC-MCNC: 120 MG/DL
POTASSIUM SERPL-SCNC: 4.5 MMOL/L
PROT SERPL-MCNC: 7 G/DL
SODIUM SERPL-SCNC: 141 MMOL/L
TRIGL SERPL-MCNC: 156 MG/DL

## 2019-11-20 ENCOUNTER — APPOINTMENT (OUTPATIENT)
Dept: OBGYN | Facility: CLINIC | Age: 74
End: 2019-11-20
Payer: MEDICARE

## 2019-11-20 VITALS — SYSTOLIC BLOOD PRESSURE: 120 MMHG | DIASTOLIC BLOOD PRESSURE: 64 MMHG | HEIGHT: 66 IN

## 2019-11-20 PROCEDURE — G0101: CPT

## 2019-11-21 RX ORDER — ASPIRIN/ACETAMINOPHEN/CAFFEINE 250-250-65
250-250-65 TABLET ORAL 3 TIMES DAILY
Refills: 0 | Status: DISCONTINUED | COMMUNITY
Start: 2019-02-19 | End: 2019-11-21

## 2019-11-21 RX ORDER — NAPROXEN SODIUM 220 MG
220 TABLET ORAL
Refills: 0 | Status: DISCONTINUED | COMMUNITY
Start: 2017-08-15 | End: 2019-11-21

## 2019-11-21 RX ORDER — HYOSCYAMINE SULFATE 0.12 MG/1
0.12 TABLET, ORALLY DISINTEGRATING ORAL
Refills: 0 | Status: DISCONTINUED | COMMUNITY
Start: 2019-02-19 | End: 2019-11-21

## 2019-11-21 NOTE — PHYSICAL EXAM
[Awake] : awake [Alert] : alert [Acute Distress] : no acute distress [Mass] : no breast mass [Nipple Discharge] : no nipple discharge [Axillary LAD] : no axillary lymphadenopathy [Soft] : soft [Tender] : non tender [Distended] : not distended [Oriented x3] : oriented to person, place, and time [Normal] : uterus [Uterine Adnexae] : were not tender and not enlarged [FreeTextEntry5] : was stenotic

## 2019-11-21 NOTE — REVIEW OF SYSTEMS
[Palpitations] : palpitations [SOB on Exertion] : shortness of breath during exertion [Diarrhea] : diarrhea [Heartburn] : heartburn [Urgency] : urgency [Headache] : headache [Sleep Disturbances] : sleep disturbances [Hot Flashes] : hot flashes [Nl] : Hematologic/Lymphatic

## 2019-11-22 ENCOUNTER — MEDICATION RENEWAL (OUTPATIENT)
Age: 74
End: 2019-11-22

## 2019-11-25 LAB — CYTOLOGY CVX/VAG DOC THIN PREP: ABNORMAL

## 2019-11-26 LAB
APPEARANCE: CLEAR
BACTERIA: NEGATIVE
BILIRUBIN URINE: NEGATIVE
BLOOD URINE: NEGATIVE
COLOR: YELLOW
GLUCOSE QUALITATIVE U: NEGATIVE
HYALINE CASTS: 2 /LPF
KETONES URINE: NEGATIVE
LEUKOCYTE ESTERASE URINE: ABNORMAL
MICROSCOPIC-UA: NORMAL
NITRITE URINE: NEGATIVE
PH URINE: 6
PROTEIN URINE: NORMAL
RED BLOOD CELLS URINE: 1 /HPF
SPECIFIC GRAVITY URINE: 1.03
SQUAMOUS EPITHELIAL CELLS: 3 /HPF
UROBILINOGEN URINE: NORMAL
WHITE BLOOD CELLS URINE: 7 /HPF

## 2019-11-27 LAB — BACTERIA UR CULT: NORMAL

## 2020-01-09 ENCOUNTER — APPOINTMENT (OUTPATIENT)
Dept: UROLOGY | Facility: CLINIC | Age: 75
End: 2020-01-09

## 2020-02-14 ENCOUNTER — APPOINTMENT (OUTPATIENT)
Dept: UROLOGY | Facility: CLINIC | Age: 75
End: 2020-02-14
Payer: MEDICARE

## 2020-02-14 VITALS
DIASTOLIC BLOOD PRESSURE: 70 MMHG | RESPIRATION RATE: 18 BRPM | HEART RATE: 91 BPM | SYSTOLIC BLOOD PRESSURE: 148 MMHG | HEIGHT: 65.5 IN

## 2020-02-14 DIAGNOSIS — Z80.0 FAMILY HISTORY OF MALIGNANT NEOPLASM OF DIGESTIVE ORGANS: ICD-10-CM

## 2020-02-14 DIAGNOSIS — N39.41 URGE INCONTINENCE: ICD-10-CM

## 2020-02-14 PROCEDURE — 51798 US URINE CAPACITY MEASURE: CPT

## 2020-02-14 PROCEDURE — 99215 OFFICE O/P EST HI 40 MIN: CPT | Mod: 25

## 2020-02-14 NOTE — REVIEW OF SYSTEMS
[Date of last menstrual period ____] : date of last menstrual period: [unfilled] [Dry Eyes] : dryness of the eyes [Strong urge to urinate] : strong urge to urinate [Wake up at night to urinate  How many times?  ___] : wakes up to urinate [unfilled] times during the night [Other ___] : [unfilled] [Bladder fullness after urinating] : bladder fullness after urinating [Bladder pressure] : experiences bladder pressure [Hot Flashes] : hot flashes [Leakage of urine with straining, coughing, laughing] : leakage of urine with straining, coughing, laughing [Negative] : Heme/Lymph

## 2020-02-15 LAB
APPEARANCE: CLEAR
BACTERIA: NEGATIVE
BILIRUBIN URINE: NEGATIVE
BLOOD URINE: NORMAL
COLOR: YELLOW
GLUCOSE QUALITATIVE U: NEGATIVE
HYALINE CASTS: 0 /LPF
KETONES URINE: NEGATIVE
LEUKOCYTE ESTERASE URINE: ABNORMAL
MICROSCOPIC-UA: NORMAL
NITRITE URINE: NEGATIVE
PH URINE: 5.5
PROTEIN URINE: NORMAL
RED BLOOD CELLS URINE: 1 /HPF
SPECIFIC GRAVITY URINE: 1.03
SQUAMOUS EPITHELIAL CELLS: 2 /HPF
UROBILINOGEN URINE: NORMAL
WHITE BLOOD CELLS URINE: 5 /HPF

## 2020-02-18 LAB — BACTERIA UR CULT: NORMAL

## 2020-02-21 ENCOUNTER — OUTPATIENT (OUTPATIENT)
Dept: OUTPATIENT SERVICES | Facility: HOSPITAL | Age: 75
LOS: 1 days | Discharge: HOME | End: 2020-02-21

## 2020-02-21 DIAGNOSIS — Z90.11 ACQUIRED ABSENCE OF RIGHT BREAST AND NIPPLE: Chronic | ICD-10-CM

## 2020-02-21 DIAGNOSIS — C50.911 MALIGNANT NEOPLASM OF UNSPECIFIED SITE OF RIGHT FEMALE BREAST: ICD-10-CM

## 2020-02-25 ENCOUNTER — RESULT REVIEW (OUTPATIENT)
Age: 75
End: 2020-02-25

## 2020-02-25 ENCOUNTER — APPOINTMENT (OUTPATIENT)
Dept: HEMATOLOGY ONCOLOGY | Facility: CLINIC | Age: 75
End: 2020-02-25
Payer: MEDICARE

## 2020-02-25 VITALS
RESPIRATION RATE: 17 BRPM | HEART RATE: 73 BPM | TEMPERATURE: 98.5 F | OXYGEN SATURATION: 97 % | SYSTOLIC BLOOD PRESSURE: 126 MMHG | DIASTOLIC BLOOD PRESSURE: 74 MMHG

## 2020-02-25 LAB
ALBUMIN SERPL ELPH-MCNC: 4.5 G/DL
ALP BLD-CCNC: 48 U/L
ALT SERPL-CCNC: 16 U/L
ANION GAP SERPL CALC-SCNC: 12 MMOL/L
AST SERPL-CCNC: 18 U/L
BILIRUB SERPL-MCNC: 0.2 MG/DL
BUN SERPL-MCNC: 28 MG/DL
CALCIUM SERPL-MCNC: 9.5 MG/DL
CHLORIDE SERPL-SCNC: 105 MMOL/L
CO2 SERPL-SCNC: 23 MMOL/L
CREAT SERPL-MCNC: 0.76 MG/DL
GLUCOSE SERPL-MCNC: 95 MG/DL
HCT VFR BLD CALC: 35.5 % — SIGNIFICANT CHANGE UP (ref 34.5–45)
HGB BLD-MCNC: 11.8 G/DL — SIGNIFICANT CHANGE UP (ref 11.5–15.5)
MCHC RBC-ENTMCNC: 28.8 PG — SIGNIFICANT CHANGE UP (ref 27–34)
MCHC RBC-ENTMCNC: 33.3 G/DL — SIGNIFICANT CHANGE UP (ref 32–36)
MCV RBC AUTO: 86.6 FL — SIGNIFICANT CHANGE UP (ref 80–100)
PLATELET # BLD AUTO: 233 K/UL — SIGNIFICANT CHANGE UP (ref 150–400)
POTASSIUM SERPL-SCNC: 4.6 MMOL/L
PROT SERPL-MCNC: 7 G/DL
RBC # BLD: 4.1 M/UL — SIGNIFICANT CHANGE UP (ref 3.8–5.2)
RBC # FLD: 14.2 % — SIGNIFICANT CHANGE UP (ref 10.3–14.5)
SODIUM SERPL-SCNC: 141 MMOL/L
WBC # BLD: 5.4 K/UL — SIGNIFICANT CHANGE UP (ref 3.8–10.5)
WBC # FLD AUTO: 5.4 K/UL — SIGNIFICANT CHANGE UP (ref 3.8–10.5)

## 2020-02-25 PROCEDURE — 99213 OFFICE O/P EST LOW 20 MIN: CPT

## 2020-02-25 RX ORDER — SERTRALINE HYDROCHLORIDE 50 MG/1
50 TABLET, FILM COATED ORAL
Refills: 0 | Status: DISCONTINUED | COMMUNITY
End: 2020-02-25

## 2020-02-25 NOTE — HISTORY OF PRESENT ILLNESS
[Disease: _____________________] : Disease: [unfilled] [T: ___] : T[unfilled] [N: ___] : N[unfilled] [M: ___] : M[unfilled] [AJCC Stage: ____] : AJCC Stage: [unfilled] [Therapy: ___] : Therapy: [unfilled] [de-identified] : The patient presented in October 2016, at age 71, with an abnormal screening mammogram of the right breast.\par \par After her initial screening mammogram on October 24, 2016 demonstrated a new nodular density in the right lower inner breast, call back right diagnostic mammogram and targeted right ultrasound were performed on November 2, 2016. Radiologic studies demonstrated a 4 mm nodular density which corresponded to a solid nodule on breast ultrasound. Ultrasound-guided core biopsy performed on November 9, 2016 was positive for  infiltrating ductal carcinoma , Terence score 6/9 along with DCIS. The infiltrating carcinoma was estrogen receptor negative, progesterone receptor negative, and HER-2 negative by IHC and by FISH. Subsequent review of the core biopsy at [facility] measured  the tumor as being at least 7 mm on core biopsy.\par \par Breast MRI at [facility] on December 1, 2016 demonstrated a 1.3 cm area of enhancement surrounding the biopsy clip in the right breast and probably benign nonspecific enhancement in other areas of the right breast for which a six-month followup MRI was suggested. The left breast was negative.\par \par On December 16, 2016 Dr. Miracle Ng performed a lumpectomy and sentinel lymph node biopsy. Pathology from that procedure demonstrated 0.4 cm  residual focus of infiltrating ductal carcinoma, Terence score 6/9, which was also ER negative, SC negative, and HER-2/markell negative. There was no DCIS within the specimen. There were 2 negative sentinel lymph nodes.\par \par The patient's postoperative course was uneventful until the appearance of swelling and purple discoloration of the right breast over the past few days and ecchymosis of the left breast.\par \par The patient's gynecologist performed a Multisite 3 BRACAnalysis on November 14, 2016 which was negative. [de-identified] : Infiltrating ductal carcinoma, Terence score 6/9, ER negative, [place] negative, HER-2/markell negative by IHC and FISH, Ki-67 50% [FreeTextEntry1] : CMF (oral Cytoxan) start 2/1/2017 - 7/11/2017.Received 86% of planned dose of Cytoxan and 92% a planned dose of methotrexate/5-FU. [de-identified] : The patient is 3  year 3  months post diagnosis of breast cancer.\par \par Completed chemotherapy 2  years  7  months  ago.\par \par Bilateral Mammogram/breast US 10/14/2019  heterogenously dense breasts, BI RADS 2\par \par Last saw surgeon Dr Ng 2/2020, thought she felt something in right axilla. Dr Ng felt it was cord.  to have breast MRI 3/10/2020.\par \par No other interval events.\par

## 2020-02-25 NOTE — REVIEW OF SYSTEMS
[Insomnia] : insomnia [Cough] : cough [Negative] : Allergic/Immunologic [FreeTextEntry2] : Energy level  normal. S/P flu  vaccination fall 2019.   [FreeTextEntry3] : Saw ophthalmologist  approximately 12/2019. [FreeTextEntry5] : Still has intermittent palpitations. [FreeTextEntry6] : Coughing and sneezing allergies.I [FreeTextEntry7] : Most recent colonoscopy 11/2019, small polyp found, patient advised to repeat in 5 years. I will request report [FreeTextEntry8] : Most recent GYN exam 11/2019. No vaginal bleeding or spotting. [FreeTextEntry9] : Most recent BMD 10/31/2019, T score left femoral neck -1.1, all other reported  areas normal. [de-identified] : Difficulty falling asleep, take Tylenol pm PRN [de-identified] : Decreased hot flashes, no longer waking her up at night.

## 2020-02-25 NOTE — PHYSICAL EXAM
[Fully active, able to carry on all pre-disease performance without restriction] : Status 0 - Fully active, able to carry on all pre-disease performance without restriction [Normal] : grossly intact [de-identified] : Asymmetry, right breast larger than left. Right breast:; scars LIQ   and axilla.Superficial cord right axilla.Left breast: no mass.

## 2020-02-26 LAB
BASOPHILS # BLD AUTO: 0 K/UL — SIGNIFICANT CHANGE UP (ref 0–0.2)
BASOPHILS NFR BLD AUTO: 0.4 % — SIGNIFICANT CHANGE UP (ref 0–2)
EOSINOPHIL # BLD AUTO: 0.2 K/UL — SIGNIFICANT CHANGE UP (ref 0–0.5)
EOSINOPHIL NFR BLD AUTO: 4.2 % — SIGNIFICANT CHANGE UP (ref 0–6)
LYMPHOCYTES # BLD AUTO: 1.5 K/UL — SIGNIFICANT CHANGE UP (ref 1–3.3)
LYMPHOCYTES # BLD AUTO: 27.4 % — SIGNIFICANT CHANGE UP (ref 13–44)
MONOCYTES # BLD AUTO: 0.6 K/UL — SIGNIFICANT CHANGE UP (ref 0–0.9)
MONOCYTES NFR BLD AUTO: 11.1 % — SIGNIFICANT CHANGE UP (ref 2–14)
NEUTROPHILS # BLD AUTO: 3.1 K/UL — SIGNIFICANT CHANGE UP (ref 1.8–7.4)
NEUTROPHILS NFR BLD AUTO: 56.9 % — SIGNIFICANT CHANGE UP (ref 43–77)

## 2020-03-18 ENCOUNTER — APPOINTMENT (OUTPATIENT)
Dept: UROLOGY | Facility: CLINIC | Age: 75
End: 2020-03-18

## 2020-03-25 ENCOUNTER — APPOINTMENT (OUTPATIENT)
Dept: UROLOGY | Facility: CLINIC | Age: 75
End: 2020-03-25

## 2020-04-01 ENCOUNTER — APPOINTMENT (OUTPATIENT)
Dept: UROLOGY | Facility: CLINIC | Age: 75
End: 2020-04-01

## 2020-04-08 ENCOUNTER — APPOINTMENT (OUTPATIENT)
Dept: UROLOGY | Facility: CLINIC | Age: 75
End: 2020-04-08

## 2020-04-15 ENCOUNTER — APPOINTMENT (OUTPATIENT)
Dept: UROLOGY | Facility: CLINIC | Age: 75
End: 2020-04-15

## 2020-06-15 ENCOUNTER — APPOINTMENT (OUTPATIENT)
Dept: CV DIAGNOSITCS | Facility: HOSPITAL | Age: 75
End: 2020-06-15

## 2020-06-15 ENCOUNTER — OUTPATIENT (OUTPATIENT)
Dept: OUTPATIENT SERVICES | Facility: HOSPITAL | Age: 75
LOS: 1 days | End: 2020-06-15
Payer: MEDICARE

## 2020-06-15 DIAGNOSIS — Z90.11 ACQUIRED ABSENCE OF RIGHT BREAST AND NIPPLE: Chronic | ICD-10-CM

## 2020-06-15 DIAGNOSIS — I35.0 NONRHEUMATIC AORTIC (VALVE) STENOSIS: ICD-10-CM

## 2020-06-15 PROCEDURE — 93306 TTE W/DOPPLER COMPLETE: CPT

## 2020-06-15 PROCEDURE — 93306 TTE W/DOPPLER COMPLETE: CPT | Mod: 26

## 2020-08-18 ENCOUNTER — OUTPATIENT (OUTPATIENT)
Dept: OUTPATIENT SERVICES | Facility: HOSPITAL | Age: 75
LOS: 1 days | Discharge: HOME | End: 2020-08-18

## 2020-08-18 DIAGNOSIS — Z90.11 ACQUIRED ABSENCE OF RIGHT BREAST AND NIPPLE: Chronic | ICD-10-CM

## 2020-08-18 DIAGNOSIS — C50.911 MALIGNANT NEOPLASM OF UNSPECIFIED SITE OF RIGHT FEMALE BREAST: ICD-10-CM

## 2020-09-28 ENCOUNTER — OUTPATIENT (OUTPATIENT)
Dept: OUTPATIENT SERVICES | Facility: HOSPITAL | Age: 75
LOS: 1 days | Discharge: HOME | End: 2020-09-28

## 2020-09-28 DIAGNOSIS — Z90.11 ACQUIRED ABSENCE OF RIGHT BREAST AND NIPPLE: Chronic | ICD-10-CM

## 2020-09-28 DIAGNOSIS — C50.911 MALIGNANT NEOPLASM OF UNSPECIFIED SITE OF RIGHT FEMALE BREAST: ICD-10-CM

## 2020-10-06 ENCOUNTER — LABORATORY RESULT (OUTPATIENT)
Age: 75
End: 2020-10-06

## 2020-10-06 ENCOUNTER — APPOINTMENT (OUTPATIENT)
Dept: HEMATOLOGY ONCOLOGY | Facility: CLINIC | Age: 75
End: 2020-10-06
Payer: MEDICARE

## 2020-10-06 ENCOUNTER — RESULT REVIEW (OUTPATIENT)
Age: 75
End: 2020-10-06

## 2020-10-06 VITALS
SYSTOLIC BLOOD PRESSURE: 151 MMHG | DIASTOLIC BLOOD PRESSURE: 71 MMHG | HEART RATE: 79 BPM | TEMPERATURE: 97.1 F | RESPIRATION RATE: 17 BRPM | OXYGEN SATURATION: 95 %

## 2020-10-06 DIAGNOSIS — Z85.3 PERSONAL HISTORY OF MALIGNANT NEOPLASM OF BREAST: ICD-10-CM

## 2020-10-06 LAB
BASOPHILS # BLD AUTO: 0.03 K/UL — SIGNIFICANT CHANGE UP (ref 0–0.2)
BASOPHILS NFR BLD AUTO: 0.6 % — SIGNIFICANT CHANGE UP (ref 0–2)
EOSINOPHIL # BLD AUTO: 0.12 K/UL — SIGNIFICANT CHANGE UP (ref 0–0.5)
EOSINOPHIL NFR BLD AUTO: 2.3 % — SIGNIFICANT CHANGE UP (ref 0–6)
HCT VFR BLD CALC: 35.8 % — SIGNIFICANT CHANGE UP (ref 34.5–45)
HGB BLD-MCNC: 11.1 G/DL — LOW (ref 11.5–15.5)
IMM GRANULOCYTES NFR BLD AUTO: 0.6 % — SIGNIFICANT CHANGE UP (ref 0–1.5)
LYMPHOCYTES # BLD AUTO: 1.28 K/UL — SIGNIFICANT CHANGE UP (ref 1–3.3)
LYMPHOCYTES # BLD AUTO: 24.8 % — SIGNIFICANT CHANGE UP (ref 13–44)
MCHC RBC-ENTMCNC: 27.1 PG — SIGNIFICANT CHANGE UP (ref 27–34)
MCHC RBC-ENTMCNC: 31 G/DL — LOW (ref 32–36)
MCV RBC AUTO: 87.3 FL — SIGNIFICANT CHANGE UP (ref 80–100)
MONOCYTES # BLD AUTO: 0.52 K/UL — SIGNIFICANT CHANGE UP (ref 0–0.9)
MONOCYTES NFR BLD AUTO: 10.1 % — SIGNIFICANT CHANGE UP (ref 2–14)
NEUTROPHILS # BLD AUTO: 3.18 K/UL — SIGNIFICANT CHANGE UP (ref 1.8–7.4)
NEUTROPHILS NFR BLD AUTO: 61.6 % — SIGNIFICANT CHANGE UP (ref 43–77)
NRBC # BLD: 0 /100 WBCS — SIGNIFICANT CHANGE UP (ref 0–0)
PLATELET # BLD AUTO: 219 K/UL — SIGNIFICANT CHANGE UP (ref 150–400)
RBC # BLD: 4.1 M/UL — SIGNIFICANT CHANGE UP (ref 3.8–5.2)
RBC # FLD: 15.5 % — HIGH (ref 10.3–14.5)
WBC # BLD: 5.16 K/UL — SIGNIFICANT CHANGE UP (ref 3.8–10.5)
WBC # FLD AUTO: 5.16 K/UL — SIGNIFICANT CHANGE UP (ref 3.8–10.5)

## 2020-10-06 PROCEDURE — 99213 OFFICE O/P EST LOW 20 MIN: CPT

## 2020-10-06 NOTE — REVIEW OF SYSTEMS
[Cough] : cough [Insomnia] : insomnia [Negative] : Allergic/Immunologic [FreeTextEntry2] : Energy level  normal. S/P flu  vaccination 9/2020. [FreeTextEntry5] : Still has intermittent palpitations. [FreeTextEntry6] : Coughing and sneezing allergies. [FreeTextEntry7] : Most recent colonoscopy 11/2019, small polyp found. [FreeTextEntry8] : Most recent GYN exam 11/2019.Has upcoming appointment 11/2020. No vaginal bleeding or spotting. [FreeTextEntry9] : Most recent BMD 10/31/2019, T score left femoral neck -1.1, all other reported  areas normal. [de-identified] : Difficulty falling asleep, take Tylenol pm PRN [de-identified] : Stable vasomotor symptoms.

## 2020-10-06 NOTE — HISTORY OF PRESENT ILLNESS
[Disease: _____________________] : Disease: [unfilled] [T: ___] : T[unfilled] [N: ___] : N[unfilled] [M: ___] : M[unfilled] [AJCC Stage: ____] : AJCC Stage: [unfilled] [Therapy: ___] : Therapy: [unfilled] [de-identified] : The patient presented in October 2016, at age 71, with an abnormal screening mammogram of the right breast.\par \par After her initial screening mammogram on October 24, 2016 demonstrated a new nodular density in the right lower inner breast, call back right diagnostic mammogram and targeted right ultrasound were performed on November 2, 2016. Radiologic studies demonstrated a 4 mm nodular density which corresponded to a solid nodule on breast ultrasound. Ultrasound-guided core biopsy performed on November 9, 2016 was positive for  infiltrating ductal carcinoma , Terence score 6/9 along with DCIS. The infiltrating carcinoma was estrogen receptor negative, progesterone receptor negative, and HER-2 negative by IHC and by FISH. Subsequent review of the core biopsy at [facility] measured  the tumor as being at least 7 mm on core biopsy.\par \par Breast MRI at [facility] on December 1, 2016 demonstrated a 1.3 cm area of enhancement surrounding the biopsy clip in the right breast and probably benign nonspecific enhancement in other areas of the right breast for which a six-month followup MRI was suggested. The left breast was negative.\par \par On December 16, 2016 Dr. Miracle Ng performed a lumpectomy and sentinel lymph node biopsy. Pathology from that procedure demonstrated 0.4 cm  residual focus of infiltrating ductal carcinoma, Terence score 6/9, which was also ER negative, ND negative, and HER-2/markell negative. There was no DCIS within the specimen. There were 2 negative sentinel lymph nodes.\par \par The patient's postoperative course was uneventful until the appearance of swelling and purple discoloration of the right breast over the past few days and ecchymosis of the left breast.\par \par The patient's gynecologist performed a Multisite 3 BRACAnalysis on November 14, 2016 which was negative. [de-identified] : Infiltrating ductal carcinoma, Terence score 6/9, ER negative, NE negative, HER-2/markell negative by IHC and FISH, Ki-67 50% [FreeTextEntry1] : CMF (oral Cytoxan) start 2/1/2017 - 7/11/2017.Received 86% of planned dose of Cytoxan and 92% a planned dose of methotrexate/5-FU. [de-identified] : The patient is 3  year 11   months post diagnosis of breast cancer.\par \par Completed chemotherapy 3  years 3  months  ago.\par \par Most recent mammogram/breast US  10/14/2019. Next mammogram will be 10/21/2020.\par \par Last saw surgeon Dr Ng 2/2020, thought she felt something in right axilla. Dr Ng felt it was cord.  Had breast MRI 3/10/2020. Patient was told it was OK. I will request report.\par \par Had follow up scheduled 10/7/2020.\par \par No other interval events.\par

## 2020-10-06 NOTE — PHYSICAL EXAM
[Fully active, able to carry on all pre-disease performance without restriction] : Status 0 - Fully active, able to carry on all pre-disease performance without restriction [Normal] : grossly intact [de-identified] : Asymmetry, right breast larger than left. Right breast:; scars LIQ   and axilla.Left breast: no mass.

## 2020-10-07 LAB
25(OH)D3 SERPL-MCNC: 49.3 NG/ML
ALBUMIN SERPL ELPH-MCNC: 4.3 G/DL
ALP BLD-CCNC: 48 U/L
ALT SERPL-CCNC: 13 U/L
ANION GAP SERPL CALC-SCNC: 13 MMOL/L
AST SERPL-CCNC: 16 U/L
BILIRUB SERPL-MCNC: <0.2 MG/DL
BUN SERPL-MCNC: 31 MG/DL
CALCIUM SERPL-MCNC: 9.4 MG/DL
CHLORIDE SERPL-SCNC: 106 MMOL/L
CHOLEST SERPL-MCNC: 174 MG/DL
CHOLEST/HDLC SERPL: 5 RATIO
CO2 SERPL-SCNC: 22 MMOL/L
CREAT SERPL-MCNC: 0.79 MG/DL
GLUCOSE SERPL-MCNC: 97 MG/DL
HDLC SERPL-MCNC: 35 MG/DL
LDLC SERPL CALC-MCNC: 109 MG/DL
POTASSIUM SERPL-SCNC: 4.6 MMOL/L
PROT SERPL-MCNC: 6.7 G/DL
SODIUM SERPL-SCNC: 141 MMOL/L
TRIGL SERPL-MCNC: 152 MG/DL

## 2020-11-23 ENCOUNTER — APPOINTMENT (OUTPATIENT)
Dept: OBGYN | Facility: CLINIC | Age: 75
End: 2020-11-23
Payer: MEDICARE

## 2020-11-23 VITALS — SYSTOLIC BLOOD PRESSURE: 170 MMHG | DIASTOLIC BLOOD PRESSURE: 72 MMHG | TEMPERATURE: 97.5 F | HEIGHT: 66 IN

## 2020-11-23 PROCEDURE — G0101: CPT

## 2020-11-30 LAB — CYTOLOGY CVX/VAG DOC THIN PREP: ABNORMAL

## 2020-12-16 PROBLEM — Z01.419 ENCOUNTER FOR GYNECOLOGICAL EXAMINATION WITHOUT ABNORMAL FINDING: Status: RESOLVED | Noted: 2017-12-17 | Resolved: 2020-12-16

## 2021-04-01 ENCOUNTER — OUTPATIENT (OUTPATIENT)
Dept: OUTPATIENT SERVICES | Facility: HOSPITAL | Age: 76
LOS: 1 days | Discharge: HOME | End: 2021-04-01

## 2021-04-01 DIAGNOSIS — C50.911 MALIGNANT NEOPLASM OF UNSPECIFIED SITE OF RIGHT FEMALE BREAST: ICD-10-CM

## 2021-04-01 DIAGNOSIS — Z90.11 ACQUIRED ABSENCE OF RIGHT BREAST AND NIPPLE: Chronic | ICD-10-CM

## 2021-04-13 ENCOUNTER — RESULT REVIEW (OUTPATIENT)
Age: 76
End: 2021-04-13

## 2021-04-13 ENCOUNTER — APPOINTMENT (OUTPATIENT)
Dept: HEMATOLOGY ONCOLOGY | Facility: CLINIC | Age: 76
End: 2021-04-13
Payer: MEDICARE

## 2021-04-13 VITALS
RESPIRATION RATE: 16 BRPM | SYSTOLIC BLOOD PRESSURE: 123 MMHG | DIASTOLIC BLOOD PRESSURE: 72 MMHG | TEMPERATURE: 97.4 F | OXYGEN SATURATION: 96 % | HEART RATE: 93 BPM

## 2021-04-13 LAB
BASOPHILS # BLD AUTO: 0.04 K/UL — SIGNIFICANT CHANGE UP (ref 0–0.2)
BASOPHILS NFR BLD AUTO: 0.7 % — SIGNIFICANT CHANGE UP (ref 0–2)
EOSINOPHIL # BLD AUTO: 0.09 K/UL — SIGNIFICANT CHANGE UP (ref 0–0.5)
EOSINOPHIL NFR BLD AUTO: 1.6 % — SIGNIFICANT CHANGE UP (ref 0–6)
HCT VFR BLD CALC: 37.8 % — SIGNIFICANT CHANGE UP (ref 34.5–45)
HGB BLD-MCNC: 11.8 G/DL — SIGNIFICANT CHANGE UP (ref 11.5–15.5)
IMM GRANULOCYTES NFR BLD AUTO: 0.9 % — SIGNIFICANT CHANGE UP (ref 0–1.5)
LYMPHOCYTES # BLD AUTO: 1.41 K/UL — SIGNIFICANT CHANGE UP (ref 1–3.3)
LYMPHOCYTES # BLD AUTO: 24.4 % — SIGNIFICANT CHANGE UP (ref 13–44)
MCHC RBC-ENTMCNC: 26.8 PG — LOW (ref 27–34)
MCHC RBC-ENTMCNC: 31.2 G/DL — LOW (ref 32–36)
MCV RBC AUTO: 85.7 FL — SIGNIFICANT CHANGE UP (ref 80–100)
MONOCYTES # BLD AUTO: 0.64 K/UL — SIGNIFICANT CHANGE UP (ref 0–0.9)
MONOCYTES NFR BLD AUTO: 11.1 % — SIGNIFICANT CHANGE UP (ref 2–14)
NEUTROPHILS # BLD AUTO: 3.56 K/UL — SIGNIFICANT CHANGE UP (ref 1.8–7.4)
NEUTROPHILS NFR BLD AUTO: 61.3 % — SIGNIFICANT CHANGE UP (ref 43–77)
NRBC # BLD: 0 /100 WBCS — SIGNIFICANT CHANGE UP (ref 0–0)
PLATELET # BLD AUTO: 226 K/UL — SIGNIFICANT CHANGE UP (ref 150–400)
RBC # BLD: 4.41 M/UL — SIGNIFICANT CHANGE UP (ref 3.8–5.2)
RBC # FLD: 15.7 % — HIGH (ref 10.3–14.5)
WBC # BLD: 5.79 K/UL — SIGNIFICANT CHANGE UP (ref 3.8–10.5)
WBC # FLD AUTO: 5.79 K/UL — SIGNIFICANT CHANGE UP (ref 3.8–10.5)

## 2021-04-13 PROCEDURE — 99214 OFFICE O/P EST MOD 30 MIN: CPT

## 2021-04-13 NOTE — PHYSICAL EXAM
[Fully active, able to carry on all pre-disease performance without restriction] : Status 0 - Fully active, able to carry on all pre-disease performance without restriction [Normal] : grossly intact [de-identified] : Asymmetry, right breast larger than left. Right breast:; scars LIQ and axilla.Left breast: no mass.

## 2021-04-13 NOTE — ASSESSMENT
[FreeTextEntry1] : Stage IB (AJCC 8 edition) infiltrating ductal carcinoma right breast, [id number], ER negative [place] negative HER-2/markell negative Ki-67 50%, Status post lumpectomy/sentinel lymph node biopsy, Status post adjuvant CMF(86% of planned Cytoxan, 92% of planned methotrexate/5-FU), Status post RT\par \par -clinically YANG\par -up to date with imaging\par -due for follow up with breast surgeon Dr. Ximena Ng which she will make appt.\par -labs today

## 2021-04-13 NOTE — HISTORY OF PRESENT ILLNESS
[Disease: _____________________] : Disease: [unfilled] [T: ___] : T[unfilled] [N: ___] : N[unfilled] [M: ___] : M[unfilled] [AJCC Stage: ____] : AJCC Stage: [unfilled] [Therapy: ___] : Therapy: [unfilled] [de-identified] : The patient presented in October 2016, at age 71, with an abnormal screening mammogram of the right breast.\par \par After her initial screening mammogram on October 24, 2016 demonstrated a new nodular density in the right lower inner breast, call back right diagnostic mammogram and targeted right ultrasound were performed on November 2, 2016. Radiologic studies demonstrated a 4 mm nodular density which corresponded to a solid nodule on breast ultrasound. \par \par Ultrasound-guided core biopsy performed on November 9, 2016 was positive for infiltrating ductal carcinoma, Terence score 6/9 along with DCIS. The infiltrating carcinoma was estrogen receptor negative, progesterone receptor negative, and HER-2 negative by IHC and by FISH. Subsequent review of the core biopsy at [facility] measured  the tumor as being at least 7 mm on core biopsy.\par \par Breast MRI at [facility] on December 1, 2016 demonstrated a 1.3 cm area of enhancement surrounding the biopsy clip in the right breast and probably benign nonspecific enhancement in other areas of the right breast for which a six-month followup MRI was suggested. The left breast was negative.\par \par On December 16, 2016 Dr. Miracle Ng performed a lumpectomy and sentinel lymph node biopsy. Pathology from that procedure demonstrated 0.4 cm residual focus of infiltrating ductal carcinoma, [place] score 6/9, which was also ER negative, SC negative, and HER-2/markell negative. There was no DCIS within the specimen. There were 2 negative sentinel lymph nodes.\par \par The patient's postoperative course was uneventful until the appearance of swelling and purple discoloration of the right breast over the past few days and ecchymosis of the left breast.\par \par Due to baseline underlying neuropathy impairing her quality of life, pt received CMF (oral Cytoxan) 2/1/2017 - 7/11/2017.  Received 86% of planned dose of Cytoxan and 92% a planned dose of methotrexate/5-FU. \par \par The patient's gynecologist performed a Multisite 3 BRACAnalysis on November 14, 2016 which was negative. [FreeTextEntry1] : CMF (oral Cytoxan) start 2/1/2017 - 7/11/2017.  Received 86% of planned dose of Cytoxan and 92% a planned dose of methotrexate/5-FU. [de-identified] : Infiltrating ductal carcinoma, Terence score 6/9, ER negative, CT negative, HER-2/markell negative by IHC and FISH, Ki-67 50% [de-identified] : \par Transfer of care from Dr. Vivian Camarillo. \par Stable vasomotor symptoms.  Feels sweats at night x 1month, no fevers, chills or weight loss.  Good appetite.\par Most recent mammogram/breast US 10/21/2020, dense breasts, BIRADS2.  stable scar tissue visualized at 4:00 in right breast unchanged, 3mm cyst at 12:00-1:00 in the left breast, previous 3mm benign nodule with clip unchanged at 3:00.\par Had breast MRI 3/10/2020, BIRADS2, no evidence of malignancy.\par Last saw surgeon Dr Ng 2/2020, MRI 2/2 right axillary cording.  Due for follow up now. \par No other interval events.\par \par HEALTH MAINTENANCE\par PCP Energy level  normal. S/P flu vaccination 9/2020.  COVID vaccine 2/15/2021 2nd dose. \par Last eye exam - narrow angle glaucoma s/p laser therapy due to for a repeat exam in the summer.\par Coughing and sneezing allergies.\par Still has intermittent palpitations.\par Most recent colonoscopy 11/2019, small polyp found.  Next due 2024. \par Most recent GYN exam Dr. Rowell  11/23/2020. No vaginal bleeding or spotting.\par Most recent BMD 10/31/2019, T score left femoral neck -1.1, all other reported  areas normal.  Due now.\par Last skin exam h/o basal forehead, neck, cheek, SCC skin cancers, sees derm q6mos\par Difficulty falling asleep, take Tylenol pm PRN\par Feels happier  from her , has good social support.

## 2021-04-16 ENCOUNTER — NON-APPOINTMENT (OUTPATIENT)
Age: 76
End: 2021-04-16

## 2021-04-16 LAB
25(OH)D3 SERPL-MCNC: 53.7 NG/ML
ALBUMIN SERPL ELPH-MCNC: 4.5 G/DL
ALP BLD-CCNC: 46 U/L
ALT SERPL-CCNC: 14 U/L
ANION GAP SERPL CALC-SCNC: 11 MMOL/L
AST SERPL-CCNC: 17 U/L
BILIRUB SERPL-MCNC: 0.2 MG/DL
BUN SERPL-MCNC: 29 MG/DL
CALCIUM SERPL-MCNC: 9.8 MG/DL
CHLORIDE SERPL-SCNC: 105 MMOL/L
CHOLEST SERPL-MCNC: 187 MG/DL
CO2 SERPL-SCNC: 25 MMOL/L
CREAT SERPL-MCNC: 0.85 MG/DL
GLUCOSE SERPL-MCNC: 91 MG/DL
HDLC SERPL-MCNC: 37 MG/DL
LDLC SERPL CALC-MCNC: 116 MG/DL
NONHDLC SERPL-MCNC: 150 MG/DL
POTASSIUM SERPL-SCNC: 4.5 MMOL/L
PROT SERPL-MCNC: 7.3 G/DL
SODIUM SERPL-SCNC: 141 MMOL/L
TRIGL SERPL-MCNC: 169 MG/DL

## 2021-10-19 ENCOUNTER — APPOINTMENT (OUTPATIENT)
Dept: HEMATOLOGY ONCOLOGY | Facility: CLINIC | Age: 76
End: 2021-10-19

## 2021-10-21 ENCOUNTER — OUTPATIENT (OUTPATIENT)
Dept: OUTPATIENT SERVICES | Facility: HOSPITAL | Age: 76
LOS: 1 days | Discharge: HOME | End: 2021-10-21

## 2021-10-21 DIAGNOSIS — C50.911 MALIGNANT NEOPLASM OF UNSPECIFIED SITE OF RIGHT FEMALE BREAST: ICD-10-CM

## 2021-10-21 DIAGNOSIS — Z90.11 ACQUIRED ABSENCE OF RIGHT BREAST AND NIPPLE: Chronic | ICD-10-CM

## 2021-10-22 ENCOUNTER — RESULT REVIEW (OUTPATIENT)
Age: 76
End: 2021-10-22

## 2021-10-22 ENCOUNTER — APPOINTMENT (OUTPATIENT)
Dept: HEMATOLOGY ONCOLOGY | Facility: CLINIC | Age: 76
End: 2021-10-22
Payer: MEDICARE

## 2021-10-22 VITALS
SYSTOLIC BLOOD PRESSURE: 125 MMHG | DIASTOLIC BLOOD PRESSURE: 63 MMHG | HEART RATE: 75 BPM | TEMPERATURE: 97.8 F | RESPIRATION RATE: 16 BRPM | OXYGEN SATURATION: 97 %

## 2021-10-22 DIAGNOSIS — Z00.00 ENCOUNTER FOR GENERAL ADULT MEDICAL EXAMINATION W/OUT ABNORMAL FINDINGS: ICD-10-CM

## 2021-10-22 DIAGNOSIS — E78.00 PURE HYPERCHOLESTEROLEMIA, UNSPECIFIED: ICD-10-CM

## 2021-10-22 DIAGNOSIS — G62.9 POLYNEUROPATHY, UNSPECIFIED: ICD-10-CM

## 2021-10-22 LAB
BASOPHILS # BLD AUTO: 0.03 K/UL — SIGNIFICANT CHANGE UP (ref 0–0.2)
BASOPHILS NFR BLD AUTO: 0.6 % — SIGNIFICANT CHANGE UP (ref 0–2)
EOSINOPHIL # BLD AUTO: 0.14 K/UL — SIGNIFICANT CHANGE UP (ref 0–0.5)
EOSINOPHIL NFR BLD AUTO: 2.8 % — SIGNIFICANT CHANGE UP (ref 0–6)
HCT VFR BLD CALC: 38 % — SIGNIFICANT CHANGE UP (ref 34.5–45)
HGB BLD-MCNC: 12.1 G/DL — SIGNIFICANT CHANGE UP (ref 11.5–15.5)
IMM GRANULOCYTES NFR BLD AUTO: 0.4 % — SIGNIFICANT CHANGE UP (ref 0–1.5)
LYMPHOCYTES # BLD AUTO: 1.13 K/UL — SIGNIFICANT CHANGE UP (ref 1–3.3)
LYMPHOCYTES # BLD AUTO: 22.3 % — SIGNIFICANT CHANGE UP (ref 13–44)
MCHC RBC-ENTMCNC: 27.3 PG — SIGNIFICANT CHANGE UP (ref 27–34)
MCHC RBC-ENTMCNC: 31.8 G/DL — LOW (ref 32–36)
MCV RBC AUTO: 85.6 FL — SIGNIFICANT CHANGE UP (ref 80–100)
MONOCYTES # BLD AUTO: 0.5 K/UL — SIGNIFICANT CHANGE UP (ref 0–0.9)
MONOCYTES NFR BLD AUTO: 9.9 % — SIGNIFICANT CHANGE UP (ref 2–14)
NEUTROPHILS # BLD AUTO: 3.25 K/UL — SIGNIFICANT CHANGE UP (ref 1.8–7.4)
NEUTROPHILS NFR BLD AUTO: 64 % — SIGNIFICANT CHANGE UP (ref 43–77)
NRBC # BLD: 0 /100 WBCS — SIGNIFICANT CHANGE UP (ref 0–0)
PLATELET # BLD AUTO: 231 K/UL — SIGNIFICANT CHANGE UP (ref 150–400)
RBC # BLD: 4.44 M/UL — SIGNIFICANT CHANGE UP (ref 3.8–5.2)
RBC # FLD: 15.4 % — HIGH (ref 10.3–14.5)
WBC # BLD: 5.07 K/UL — SIGNIFICANT CHANGE UP (ref 3.8–10.5)
WBC # FLD AUTO: 5.07 K/UL — SIGNIFICANT CHANGE UP (ref 3.8–10.5)

## 2021-10-22 PROCEDURE — 99214 OFFICE O/P EST MOD 30 MIN: CPT

## 2021-10-22 NOTE — PHYSICAL EXAM
[de-identified] : Asymmetry, right breast larger than left. Right breast: scars LIQ, firm breast and axilla.Left breast: no mass.

## 2021-10-22 NOTE — ASSESSMENT
[FreeTextEntry1] : Stage IB (AJCC 8 edition) infiltrating ductal carcinoma right breast, [id number], ER negative [place] negative HER-2/markell negative Ki-67 50%, Status post lumpectomy/sentinel lymph node biopsy, Status post adjuvant CMF(86% of planned Cytoxan, 92% of planned methotrexate/5-FU), Status post RT\par \par -on active surveillance\par -clinically YANG\par -up to date with imaging\par -due for follow up with breast surgeon Dr. Ximena Ng which she will make appt.\par -bone density due, script given\par -labs today\par -FU in 6mos

## 2021-10-22 NOTE — CONSULT LETTER
[Dear  ___] : Dear  [unfilled], [Courtesy Letter:] : I had the pleasure of seeing your patient, [unfilled], in my office today. [Consult Closing:] : Thank you very much for allowing me to participate in the care of this patient.  If you have any questions, please do not hesitate to contact me. [Sincerely,] : Sincerely, [FreeTextEntry2] : Ximena Ng MD [FreeTextEntry3] : Estefania Brian MD

## 2021-10-22 NOTE — HISTORY OF PRESENT ILLNESS
[de-identified] : The patient presented in October 2016, at age 71, with an abnormal screening mammogram of the right breast.\par \par After her initial screening mammogram on October 24, 2016 demonstrated a new nodular density in the right lower inner breast, call back right diagnostic mammogram and targeted right ultrasound were performed on November 2, 2016. Radiologic studies demonstrated a 4 mm nodular density which corresponded to a solid nodule on breast ultrasound. \par \par Ultrasound-guided core biopsy performed on November 9, 2016 was positive for infiltrating ductal carcinoma, Terence score 6/9 along with DCIS. The infiltrating carcinoma was estrogen receptor negative, progesterone receptor negative, and HER-2 negative by IHC and by FISH. Subsequent review of the core biopsy at [facility] measured  the tumor as being at least 7 mm on core biopsy.\par \par Breast MRI at [facility] on December 1, 2016 demonstrated a 1.3 cm area of enhancement surrounding the biopsy clip in the right breast and probably benign nonspecific enhancement in other areas of the right breast for which a six-month followup MRI was suggested. The left breast was negative.\par \par On December 16, 2016 Dr. Miracle Ng performed a lumpectomy and sentinel lymph node biopsy. Pathology from that procedure demonstrated 0.4 cm residual focus of infiltrating ductal carcinoma, [place] score 6/9, which was also ER negative, [place] negative, and HER-2/markell negative. There was no DCIS within the specimen. There were 2 negative sentinel lymph nodes.\par \par The patient's postoperative course was uneventful until the appearance of swelling and purple discoloration of the right breast over the past few days and ecchymosis of the left breast.\par \par Due to baseline underlying neuropathy impairing her quality of life, pt received CMF (oral Cytoxan) 2/1/2017 - 7/11/2017.  Received 86% of planned dose of Cytoxan and 92% a planned dose of methotrexate/5-FU. \par \par The patient's gynecologist performed a Multisite 3 BRACAnalysis on November 14, 2016 which was negative. [de-identified] : Infiltrating ductal carcinoma, Terence score 6/9, ER negative, [place] negative, HER-2/markell negative by IHC and FISH, Ki-67 50% [FreeTextEntry1] : CMF (oral Cytoxan) start 2/1/2017 - 7/11/2017.  Received 86% of planned dose of Cytoxan and 92% a planned dose of methotrexate/5-FU. [de-identified] : \par Pt feels well, she has no complaints.  No interval events. \par Most recent mammogram/breast US 10/21/2020, dense breasts, BIRADS2.  stable scar tissue visualized at 4:00 in right breast unchanged, 3mm cyst at 12:00-1:00 in the left breast, previous 3mm benign nodule with clip unchanged at 3:00.  Has next appt on 10/27, pt will have report sent to us. \par Had breast MRI 3/10/2020, BIRADS2, no evidence of malignancy. Repeat MRI in 3 years.\par Last saw surgeon Dr Ximena Ng 10/2021, MRI 2/2 right axillary cording.  \par Labs reviewed from 4/13, elevated cholesterol panel\par \par HEALTH MAINTENANCE\par PCP Energy level  normal. S/P flu vaccination 9/2020.  \par Last eye exam - narrow angle glaucoma s/p laser therapy due to for a repeat exam in the summer.\par Coughing and sneezing allergies. \par Still has intermittent palpitations.\par Most recent colonoscopy 11/2019, small polyp found.  Next due 2024. \par Most recent GYN exam Dr. Rowell  11/23/2020. No vaginal bleeding or spotting.\par Most recent BMD 10/31/2019, T score left femoral neck -1.1, all other reported  areas normal.  Due now, script given.\par DERM Dr. Gloria Melo, recent allergic reaction on hands. Last skin exam h/o basal forehead, neck, cheek, SCC skin cancers, sees derm q6mos\par Difficulty falling asleep, take Tylenol pm PRN\par Feels happier  from her , has good social support.\par COVID vaccine 2/15/2021 2nd dose.

## 2021-10-25 ENCOUNTER — NON-APPOINTMENT (OUTPATIENT)
Age: 76
End: 2021-10-25

## 2021-10-25 LAB
25(OH)D3 SERPL-MCNC: 54.4 NG/ML
ALBUMIN SERPL ELPH-MCNC: 4.4 G/DL
ALP BLD-CCNC: 48 U/L
ALT SERPL-CCNC: 16 U/L
ANION GAP SERPL CALC-SCNC: 13 MMOL/L
AST SERPL-CCNC: 17 U/L
BASOPHILS # BLD AUTO: 0.03 K/UL
BASOPHILS NFR BLD AUTO: 0.6 %
BILIRUB SERPL-MCNC: 0.2 MG/DL
BUN SERPL-MCNC: 29 MG/DL
CALCIUM SERPL-MCNC: 9.9 MG/DL
CHLORIDE SERPL-SCNC: 105 MMOL/L
CHOLEST SERPL-MCNC: 184 MG/DL
CO2 SERPL-SCNC: 23 MMOL/L
CREAT SERPL-MCNC: 0.89 MG/DL
EOSINOPHIL # BLD AUTO: 0.13 K/UL
EOSINOPHIL NFR BLD AUTO: 2.6 %
ESTIMATED AVERAGE GLUCOSE: 131 MG/DL
GLUCOSE SERPL-MCNC: 97 MG/DL
HBA1C MFR BLD HPLC: 6.2 %
HCT VFR BLD CALC: 39.9 %
HDLC SERPL-MCNC: 36 MG/DL
HGB BLD-MCNC: 12.2 G/DL
IMM GRANULOCYTES NFR BLD AUTO: 0.4 %
LDLC SERPL CALC-MCNC: 122 MG/DL
LYMPHOCYTES # BLD AUTO: 1.14 K/UL
LYMPHOCYTES NFR BLD AUTO: 22.9 %
MAN DIFF?: NORMAL
MCHC RBC-ENTMCNC: 26.8 PG
MCHC RBC-ENTMCNC: 30.6 GM/DL
MCV RBC AUTO: 87.7 FL
MONOCYTES # BLD AUTO: 0.52 K/UL
MONOCYTES NFR BLD AUTO: 10.5 %
NEUTROPHILS # BLD AUTO: 3.13 K/UL
NEUTROPHILS NFR BLD AUTO: 63 %
NONHDLC SERPL-MCNC: 148 MG/DL
PLATELET # BLD AUTO: 224 K/UL
POTASSIUM SERPL-SCNC: 4.8 MMOL/L
PROT SERPL-MCNC: 7.2 G/DL
RBC # BLD: 4.55 M/UL
RBC # FLD: 16 %
SODIUM SERPL-SCNC: 141 MMOL/L
TRIGL SERPL-MCNC: 129 MG/DL
TSH SERPL-ACNC: 2.11 UIU/ML
WBC # FLD AUTO: 4.97 K/UL

## 2021-11-01 ENCOUNTER — NON-APPOINTMENT (OUTPATIENT)
Age: 76
End: 2021-11-01

## 2021-12-15 ENCOUNTER — APPOINTMENT (OUTPATIENT)
Dept: OBGYN | Facility: CLINIC | Age: 76
End: 2021-12-15
Payer: MEDICARE

## 2021-12-15 VITALS — SYSTOLIC BLOOD PRESSURE: 122 MMHG | HEIGHT: 66 IN | DIASTOLIC BLOOD PRESSURE: 60 MMHG

## 2021-12-15 DIAGNOSIS — N89.8 OTHER SPECIFIED NONINFLAMMATORY DISORDERS OF VAGINA: ICD-10-CM

## 2021-12-15 PROCEDURE — 99213 OFFICE O/P EST LOW 20 MIN: CPT

## 2021-12-20 PROBLEM — N89.8 VAGINAL ITCHING: Status: ACTIVE | Noted: 2021-12-20

## 2021-12-20 LAB — CYTOLOGY CVX/VAG DOC THIN PREP: NORMAL

## 2022-03-31 ENCOUNTER — OUTPATIENT (OUTPATIENT)
Dept: OUTPATIENT SERVICES | Facility: HOSPITAL | Age: 77
LOS: 1 days | Discharge: HOME | End: 2022-03-31

## 2022-03-31 DIAGNOSIS — C50.911 MALIGNANT NEOPLASM OF UNSPECIFIED SITE OF RIGHT FEMALE BREAST: ICD-10-CM

## 2022-03-31 DIAGNOSIS — Z90.11 ACQUIRED ABSENCE OF RIGHT BREAST AND NIPPLE: Chronic | ICD-10-CM

## 2022-04-05 ENCOUNTER — APPOINTMENT (OUTPATIENT)
Dept: HEMATOLOGY ONCOLOGY | Facility: CLINIC | Age: 77
End: 2022-04-05
Payer: MEDICARE

## 2022-04-05 VITALS
HEART RATE: 76 BPM | WEIGHT: 186.29 LBS | DIASTOLIC BLOOD PRESSURE: 79 MMHG | RESPIRATION RATE: 16 BRPM | OXYGEN SATURATION: 97 % | TEMPERATURE: 97.7 F | SYSTOLIC BLOOD PRESSURE: 150 MMHG | BODY MASS INDEX: 29.94 KG/M2 | HEIGHT: 65.98 IN

## 2022-04-05 DIAGNOSIS — Z00.00 ENCOUNTER FOR GENERAL ADULT MEDICAL EXAMINATION W/OUT ABNORMAL FINDINGS: ICD-10-CM

## 2022-04-05 PROCEDURE — 99214 OFFICE O/P EST MOD 30 MIN: CPT

## 2022-04-05 NOTE — PHYSICAL EXAM
[Fully active, able to carry on all pre-disease performance without restriction] : Status 0 - Fully active, able to carry on all pre-disease performance without restriction [Normal] : grossly intact [de-identified] : Right breast: scars LIQ, firm breast and axilla. skin lesion 12:00, Left breast: no mass.

## 2022-04-05 NOTE — ASSESSMENT
[FreeTextEntry1] : Stage IB (AJCC 8 edition) infiltrating ductal carcinoma right breast, [id number], ER negative OH negative HER-2/markell negative Ki-67 50%, Status post lumpectomy/sentinel lymph node biopsy, Status post adjuvant CMF(86% of planned Cytoxan, 92% of planned methotrexate/5-FU), Status post RT\par \par -on active surveillance\par -clinically YANG\par -up to date with imaging\par -continue to follow up with breast surgeon Dr. Ximena Ng as recommended\par -FU with derm for right breast skin lesion\par -will get copy of recent labs from PCP\par -FU in one year

## 2022-04-05 NOTE — HISTORY OF PRESENT ILLNESS
[Disease: _____________________] : Disease: [unfilled] [T: ___] : T[unfilled] [N: ___] : N[unfilled] [M: ___] : M[unfilled] [AJCC Stage: ____] : AJCC Stage: [unfilled] [Therapy: ___] : Therapy: [unfilled] [de-identified] : The patient presented in October 2016, at age 71, with an abnormal screening mammogram of the right breast.\par \par After her initial screening mammogram on October 24, 2016 demonstrated a new nodular density in the right lower inner breast, call back right diagnostic mammogram and targeted right ultrasound were performed on November 2, 2016. Radiologic studies demonstrated a 4 mm nodular density which corresponded to a solid nodule on breast ultrasound. \par \par Ultrasound-guided core biopsy performed on November 9, 2016 was positive for infiltrating ductal carcinoma, Terence score 6/9 along with DCIS. The infiltrating carcinoma was estrogen receptor negative, progesterone receptor negative, and HER-2 negative by IHC and by FISH. Subsequent review of the core biopsy at [facility] measured  the tumor as being at least 7 mm on core biopsy.\par \par Breast MRI at [facility] on December 1, 2016 demonstrated a 1.3 cm area of enhancement surrounding the biopsy clip in the right breast and probably benign nonspecific enhancement in other areas of the right breast for which a six-month followup MRI was suggested. The left breast was negative.\par \par On December 16, 2016 Dr. Miracle Ng performed a lumpectomy and sentinel lymph node biopsy. Pathology from that procedure demonstrated 0.4 cm residual focus of infiltrating ductal carcinoma, [place] score 6/9, which was also ER negative, GA negative, and HER-2/markell negative. There was no DCIS within the specimen. There were 2 negative sentinel lymph nodes.\par \par The patient's postoperative course was uneventful until the appearance of swelling and purple discoloration of the right breast over the past few days and ecchymosis of the left breast.\par \par Due to baseline underlying neuropathy impairing her quality of life, pt received CMF (oral Cytoxan) 2/1/2017 - 7/11/2017.  Received 86% of planned dose of Cytoxan and 92% a planned dose of methotrexate/5-FU. \par \par The patient's gynecologist performed a Multisite 3 BRACAnalysis on November 14, 2016 which was negative. [de-identified] : Infiltrating ductal carcinoma, Terence score 6/9, ER negative, NV negative, HER-2/markell negative by IHC and FISH, Ki-67 50% [FreeTextEntry1] : CMF (oral Cytoxan) start 2/1/2017 - 7/11/2017.  Received 86% of planned dose of Cytoxan and 92% a planned dose of methotrexate/5-FU. [de-identified] : \par Pt feels well, she has no complaints.  No interval events. \par mammogram/breast US 10/27/21 BIRADS2 stable, next ordered in [employer]. \par Had breast MRI 3/10/2020, BIRADS2, no evidence of malignancy. Repeat MRI in 3 years.\par Last saw surgeon Dr Ximena Ng 10/2021, exam stable.  Seeing 11/2022. \par Labs reviewed from 4/13, elevated cholesterol panel; had recent labs for PCP.\par \par HEALTH MAINTENANCE\par PCP Dr. Wilfrido Thomas\par Last eye exam - narrow angle glaucoma s/p laser therapy due to for a repeat exam in the summer. s/p b/l cataract removal 1/2022. \par ENT Coughing and sneezing allergies. \par CARDS Still has intermittent palpitations.\par Most recent colonoscopy 11/2019, small polyp found.  Next due 2024. \par Most recent GYN exam Dr. Rowell  11/23/2020. No vaginal bleeding or spotting.\par Most recent BMD 11/1/21 normal.\par DERM Dr. Gloria Melo, recent allergic reaction on hands. Last skin exam h/o basal forehead, neck, cheek, SCC skin cancers, sees derm q6mos\par Difficulty falling asleep, take Tylenol pm PRN.\par Feels happier  from her , has good social support.\par COVID vaccine 2/15/2021 2nd dose.

## 2023-04-25 ENCOUNTER — OUTPATIENT (OUTPATIENT)
Dept: OUTPATIENT SERVICES | Facility: HOSPITAL | Age: 78
LOS: 1 days | Discharge: HOME | End: 2023-04-25

## 2023-04-25 DIAGNOSIS — C50.911 MALIGNANT NEOPLASM OF UNSPECIFIED SITE OF RIGHT FEMALE BREAST: ICD-10-CM

## 2023-04-25 DIAGNOSIS — Z90.11 ACQUIRED ABSENCE OF RIGHT BREAST AND NIPPLE: Chronic | ICD-10-CM

## 2023-05-01 ENCOUNTER — APPOINTMENT (OUTPATIENT)
Dept: HEMATOLOGY ONCOLOGY | Facility: CLINIC | Age: 78
End: 2023-05-01
Payer: MEDICARE

## 2023-05-01 VITALS
SYSTOLIC BLOOD PRESSURE: 132 MMHG | DIASTOLIC BLOOD PRESSURE: 76 MMHG | RESPIRATION RATE: 16 BRPM | OXYGEN SATURATION: 99 % | HEART RATE: 96 BPM | TEMPERATURE: 97.8 F

## 2023-05-01 DIAGNOSIS — Z17.1 MALIGNANT NEOPLASM OF UNSPECIFIED SITE OF RIGHT FEMALE BREAST: ICD-10-CM

## 2023-05-01 DIAGNOSIS — C50.911 MALIGNANT NEOPLASM OF UNSPECIFIED SITE OF RIGHT FEMALE BREAST: ICD-10-CM

## 2023-05-01 PROCEDURE — 99213 OFFICE O/P EST LOW 20 MIN: CPT

## 2023-05-01 RX ORDER — ROSUVASTATIN CALCIUM 10 MG/1
10 TABLET, FILM COATED ORAL
Qty: 90 | Refills: 0 | Status: ACTIVE | COMMUNITY
Start: 2023-04-04

## 2023-05-01 RX ORDER — ASPIRIN ENTERIC COATED TABLETS 81 MG 81 MG/1
81 TABLET, DELAYED RELEASE ORAL
Refills: 0 | Status: COMPLETED | COMMUNITY
Start: 2017-08-15 | End: 2023-05-01

## 2023-05-01 RX ORDER — NYSTATIN AND TRIAMCINOLONE ACETONIDE 100000; 1 [USP'U]/G; MG/G
100000-0.1 OINTMENT TOPICAL TWICE DAILY
Qty: 1 | Refills: 2 | Status: COMPLETED | COMMUNITY
Start: 2021-12-20 | End: 2023-05-01

## 2023-05-01 NOTE — ASSESSMENT
[FreeTextEntry1] : Stage IB (AJCC 8 edition) infiltrating ductal carcinoma right breast, [id number], ER negative ND negative HER-2/markell negative Ki-67 50%, Status post lumpectomy/sentinel lymph node biopsy, Status post adjuvant CMF(86% of planned Cytoxan, 92% of planned methotrexate/5-FU), Status post RT\par \par -on active surveillance\par -clinically YANG\par -up to date with imaging - need to obtain copies\par -continue to follow up with breast surgeon Dr. Ximena Ng as recommended\par - discuss updating genetic testing at next visit - Multisite 3 completed\par -FU in one year\par \par PMD at least annually with lipid checks/bloodwork, gyn at least annually and PAP test screening per their recommendations, colon cancer screening/colonoscopy at least every 10 years as recommended by PMD/GI , dermatology for annual skin checks  ophthalmology for annual eye exams\par - Colonoscopy is due 2024\par

## 2023-05-01 NOTE — HISTORY OF PRESENT ILLNESS
[Disease: _____________________] : Disease: [unfilled] [T: ___] : T[unfilled] [N: ___] : N[unfilled] [M: ___] : M[unfilled] [AJCC Stage: ____] : AJCC Stage: [unfilled] [Therapy: ___] : Therapy: [unfilled] [de-identified] : The patient presented in October 2016, at age 71, with an abnormal screening mammogram of the right breast.\par \par After her initial screening mammogram on October 24, 2016 demonstrated a new nodular density in the right lower inner breast, call back right diagnostic mammogram and targeted right ultrasound were performed on November 2, 2016. Radiologic studies demonstrated a 4 mm nodular density which corresponded to a solid nodule on breast ultrasound. \par \par Ultrasound-guided core biopsy performed on November 9, 2016 was positive for infiltrating ductal carcinoma, Terence score 6/9 along with DCIS. The infiltrating carcinoma was estrogen receptor negative, progesterone receptor negative, and HER-2 negative by IHC and by FISH. Subsequent review of the core biopsy at [facility] measured  the tumor as being at least 7 mm on core biopsy.\par \par Breast MRI at [facility] on December 1, 2016 demonstrated a 1.3 cm area of enhancement surrounding the biopsy clip in the right breast and probably benign nonspecific enhancement in other areas of the right breast for which a six-month followup MRI was suggested. The left breast was negative.\par \par On December 16, 2016 Dr. Miracle Ng performed a lumpectomy and sentinel lymph node biopsy. Pathology from that procedure demonstrated 0.4 cm residual focus of infiltrating ductal carcinoma, [place] score 6/9, which was also ER negative, VT negative, and HER-2/markell negative. There was no DCIS within the specimen. There were 2 negative sentinel lymph nodes.\par \par The patient's postoperative course was uneventful until the appearance of swelling and purple discoloration of the right breast over the past few days and ecchymosis of the left breast.\par \par Due to baseline underlying neuropathy impairing her quality of life, pt received CMF (oral Cytoxan) 2/1/2017 - 7/11/2017.  Received 86% of planned dose of Cytoxan and 92% a planned dose of methotrexate/5-FU. \par \par The patient's gynecologist performed a Multisite 3 BRACAnalysis on November 14, 2016 which was negative. [de-identified] : Infiltrating ductal carcinoma, Terence score 6/9, ER negative, MA negative, HER-2/markell negative by IHC and FISH, Ki-67 50% [FreeTextEntry1] : CMF (oral Cytoxan) start 2/1/2017 - 7/11/2017.  Received 86% of planned dose of Cytoxan and 92% a planned dose of methotrexate/5-FU. [de-identified] : 5/1/2023\par Pt feels well, she has no complaints.  No interval events. \par Patient denies any SOB, CP, abdominal pain, bone pain, headache, or unexplained weight loss\par Patient denies any breast masses, breast tenderness, skin changes or nipple discharge.\par \par mammogram/breast US 10/22 -  [facility] - \par Breast MRI completed last month 3/2023 - [facility]\par No breast imaging reports available for review\par Last saw surgeon Dr Ximena Ng saw  11/2022. \par \par \par HEALTH MAINTENANCE\par PCP Dr. Wilfrido Thomas\par Last eye exam - narrow angle glaucoma s/p laser therapy due to for a repeat exam in the summer. s/p b/l cataract removal 1/2022. \par ENT Coughing and sneezing allergies. \par CARDS Still has intermittent palpitations.\par Most recent colonoscopy 11/2019, small polyp found.  Next due 2024. \par Most recent GYN exam Dr. Rowell  No vaginal bleeding or spotting.\par Most recent BMD 11/1/21 normal.\par DERM Dr. Gloria Melo, recent allergic reaction on hands. Last skin exam h/o basal forehead, neck, cheek, SCC skin cancers, sees derm q6mos\par Difficulty falling asleep, take Tylenol pm PRN.\par Feels happier  from her , has good social support.\par COVID vaccine 2/15/2021 2nd dose.

## 2023-05-01 NOTE — PHYSICAL EXAM
[Fully active, able to carry on all pre-disease performance without restriction] : Status 0 - Fully active, able to carry on all pre-disease performance without restriction [Normal] : grossly intact [de-identified] : Right breast: scars LIQ, firm breast and axilla.  Left breast: no mass.

## 2023-05-22 ENCOUNTER — APPOINTMENT (OUTPATIENT)
Dept: OBGYN | Facility: CLINIC | Age: 78
End: 2023-05-22
Payer: MEDICARE

## 2023-05-22 VITALS — DIASTOLIC BLOOD PRESSURE: 74 MMHG | SYSTOLIC BLOOD PRESSURE: 132 MMHG

## 2023-05-22 PROCEDURE — 99213 OFFICE O/P EST LOW 20 MIN: CPT

## 2023-05-29 LAB — CYTOLOGY CVX/VAG DOC THIN PREP: NORMAL

## 2024-01-08 ENCOUNTER — NON-APPOINTMENT (OUTPATIENT)
Age: 79
End: 2024-01-08

## 2024-01-08 ENCOUNTER — APPOINTMENT (OUTPATIENT)
Dept: OPHTHALMOLOGY | Facility: CLINIC | Age: 79
End: 2024-01-08
Payer: MEDICARE

## 2024-01-08 PROCEDURE — 92002 INTRM OPH EXAM NEW PATIENT: CPT | Mod: 25

## 2024-01-08 PROCEDURE — 68761 CLOSE TEAR DUCT OPENING: CPT | Mod: E2,E4

## 2024-02-12 ENCOUNTER — NON-APPOINTMENT (OUTPATIENT)
Age: 79
End: 2024-02-12

## 2024-02-12 ENCOUNTER — APPOINTMENT (OUTPATIENT)
Dept: OPHTHALMOLOGY | Facility: CLINIC | Age: 79
End: 2024-02-12
Payer: MEDICARE

## 2024-02-12 PROCEDURE — 92012 INTRM OPH EXAM EST PATIENT: CPT

## 2024-04-09 ENCOUNTER — NON-APPOINTMENT (OUTPATIENT)
Age: 79
End: 2024-04-09

## 2024-05-28 ENCOUNTER — APPOINTMENT (OUTPATIENT)
Dept: OBGYN | Facility: CLINIC | Age: 79
End: 2024-05-28
Payer: MEDICARE

## 2024-05-28 VITALS — HEIGHT: 66 IN | SYSTOLIC BLOOD PRESSURE: 122 MMHG | DIASTOLIC BLOOD PRESSURE: 72 MMHG

## 2024-05-28 DIAGNOSIS — C50.919 MALIGNANT NEOPLASM OF UNSPECIFIED SITE OF UNSPECIFIED FEMALE BREAST: ICD-10-CM

## 2024-05-28 DIAGNOSIS — Z01.411 ENCOUNTER FOR GYNECOLOGICAL EXAMINATION (GENERAL) (ROUTINE) WITH ABNORMAL FINDINGS: ICD-10-CM

## 2024-05-28 PROCEDURE — G0101: CPT

## 2024-05-28 RX ORDER — ATENOLOL 25 MG/1
25 TABLET ORAL
Qty: 45 | Refills: 0 | Status: DISCONTINUED | COMMUNITY
Start: 2023-03-08 | End: 2024-05-28

## 2024-06-02 LAB — CYTOLOGY CVX/VAG DOC THIN PREP: NORMAL

## 2024-12-19 ENCOUNTER — APPOINTMENT (OUTPATIENT)
Dept: BARIATRICS | Facility: CLINIC | Age: 79
End: 2024-12-19
Payer: MEDICARE

## 2024-12-19 VITALS
OXYGEN SATURATION: 98 % | BODY MASS INDEX: 34.25 KG/M2 | SYSTOLIC BLOOD PRESSURE: 150 MMHG | HEIGHT: 66 IN | HEART RATE: 83 BPM | TEMPERATURE: 98.1 F | DIASTOLIC BLOOD PRESSURE: 74 MMHG | WEIGHT: 213.13 LBS

## 2024-12-19 DIAGNOSIS — E66.01 MORBID (SEVERE) OBESITY DUE TO EXCESS CALORIES: ICD-10-CM

## 2024-12-19 PROCEDURE — 99204 OFFICE O/P NEW MOD 45 MIN: CPT

## 2025-01-16 DIAGNOSIS — Z00.00 ENCOUNTER FOR GENERAL ADULT MEDICAL EXAMINATION W/OUT ABNORMAL FINDINGS: ICD-10-CM

## 2025-01-16 DIAGNOSIS — E66.811 OBESITY, CLASS 1: ICD-10-CM

## 2025-01-28 ENCOUNTER — APPOINTMENT (OUTPATIENT)
Dept: BARIATRICS | Facility: CLINIC | Age: 80
End: 2025-01-28
Payer: MEDICARE

## 2025-01-28 VITALS — BODY MASS INDEX: 34.23 KG/M2 | WEIGHT: 213 LBS | HEIGHT: 66 IN

## 2025-01-28 PROCEDURE — 98968 PH1 ASSMT&MGMT NQHP 21-30: CPT

## 2025-02-03 ENCOUNTER — APPOINTMENT (OUTPATIENT)
Dept: BARIATRICS | Facility: CLINIC | Age: 80
End: 2025-02-03

## 2025-02-05 ENCOUNTER — NON-APPOINTMENT (OUTPATIENT)
Age: 80
End: 2025-02-05

## 2025-02-14 ENCOUNTER — NON-APPOINTMENT (OUTPATIENT)
Age: 80
End: 2025-02-14

## 2025-02-14 RX ORDER — FOLIC ACID 1 MG/1
1 TABLET ORAL DAILY
Qty: 30 | Refills: 0 | Status: ACTIVE | COMMUNITY
Start: 2025-02-14 | End: 1900-01-01

## 2025-02-25 ENCOUNTER — APPOINTMENT (OUTPATIENT)
Dept: BARIATRICS | Facility: CLINIC | Age: 80
End: 2025-02-25
Payer: MEDICARE

## 2025-02-25 PROCEDURE — 98967 PH1 ASSMT&MGMT NQHP 11-20: CPT

## 2025-03-10 ENCOUNTER — INPATIENT (INPATIENT)
Facility: HOSPITAL | Age: 80
LOS: 1 days | Discharge: HOME | DRG: 909 | End: 2025-03-12
Attending: INTERNAL MEDICINE | Admitting: GENERAL ACUTE CARE HOSPITAL
Payer: MEDICARE

## 2025-03-10 VITALS — WEIGHT: 212.97 LBS | HEIGHT: 66 IN

## 2025-03-10 DIAGNOSIS — Z90.11 ACQUIRED ABSENCE OF RIGHT BREAST AND NIPPLE: Chronic | ICD-10-CM

## 2025-03-10 DIAGNOSIS — R00.2 PALPITATIONS: ICD-10-CM

## 2025-03-10 DIAGNOSIS — G62.9 POLYNEUROPATHY, UNSPECIFIED: ICD-10-CM

## 2025-03-10 DIAGNOSIS — F32.A DEPRESSION, UNSPECIFIED: ICD-10-CM

## 2025-03-10 DIAGNOSIS — I97.3 POSTPROCEDURAL HYPERTENSION: ICD-10-CM

## 2025-03-10 DIAGNOSIS — Z85.3 PERSONAL HISTORY OF MALIGNANT NEOPLASM OF BREAST: ICD-10-CM

## 2025-03-10 DIAGNOSIS — R73.03 PREDIABETES: ICD-10-CM

## 2025-03-10 DIAGNOSIS — N32.81 OVERACTIVE BLADDER: ICD-10-CM

## 2025-03-10 DIAGNOSIS — Z85.828 PERSONAL HISTORY OF OTHER MALIGNANT NEOPLASM OF SKIN: ICD-10-CM

## 2025-03-10 DIAGNOSIS — I16.0 HYPERTENSIVE URGENCY: ICD-10-CM

## 2025-03-10 DIAGNOSIS — E66.01 MORBID (SEVERE) OBESITY DUE TO EXCESS CALORIES: ICD-10-CM

## 2025-03-10 DIAGNOSIS — I1A.0 RESISTANT HYPERTENSION: ICD-10-CM

## 2025-03-10 LAB
A1C WITH ESTIMATED AVERAGE GLUCOSE RESULT: 6.3 % — HIGH (ref 4–5.6)
ADD ON TEST-SPECIMEN IN LAB: SIGNIFICANT CHANGE UP
ALBUMIN SERPL ELPH-MCNC: 3.9 G/DL — SIGNIFICANT CHANGE UP (ref 3.3–5)
ALP SERPL-CCNC: 44 U/L — SIGNIFICANT CHANGE UP (ref 40–120)
ALT FLD-CCNC: 16 U/L — SIGNIFICANT CHANGE UP (ref 10–45)
ANION GAP SERPL CALC-SCNC: 13 MMOL/L — SIGNIFICANT CHANGE UP (ref 5–17)
AST SERPL-CCNC: 20 U/L — SIGNIFICANT CHANGE UP (ref 10–40)
BASOPHILS # BLD AUTO: 0.01 K/UL — SIGNIFICANT CHANGE UP (ref 0–0.2)
BASOPHILS NFR BLD AUTO: 0.1 % — SIGNIFICANT CHANGE UP (ref 0–2)
BILIRUB SERPL-MCNC: 0.3 MG/DL — SIGNIFICANT CHANGE UP (ref 0.2–1.2)
BLD GP AB SCN SERPL QL: NEGATIVE — SIGNIFICANT CHANGE UP
BUN SERPL-MCNC: 14 MG/DL — SIGNIFICANT CHANGE UP (ref 7–23)
CALCIUM SERPL-MCNC: 8.6 MG/DL — SIGNIFICANT CHANGE UP (ref 8.4–10.5)
CHLORIDE SERPL-SCNC: 103 MMOL/L — SIGNIFICANT CHANGE UP (ref 96–108)
CK MB CFR SERPL CALC: 7.2 NG/ML — HIGH (ref 0–6.7)
CK MB CFR SERPL CALC: 7.3 NG/ML — HIGH (ref 0–6.7)
CK SERPL-CCNC: 214 U/L — HIGH (ref 25–170)
CK SERPL-CCNC: 219 U/L — HIGH (ref 25–170)
CO2 SERPL-SCNC: 22 MMOL/L — SIGNIFICANT CHANGE UP (ref 22–31)
CREAT SERPL-MCNC: 0.78 MG/DL — SIGNIFICANT CHANGE UP (ref 0.5–1.3)
EGFR: 77 ML/MIN/1.73M2 — SIGNIFICANT CHANGE UP
EGFR: 77 ML/MIN/1.73M2 — SIGNIFICANT CHANGE UP
EOSINOPHIL # BLD AUTO: 0.03 K/UL — SIGNIFICANT CHANGE UP (ref 0–0.5)
EOSINOPHIL NFR BLD AUTO: 0.3 % — SIGNIFICANT CHANGE UP (ref 0–6)
ESTIMATED AVERAGE GLUCOSE: 134 MG/DL — HIGH (ref 68–114)
GLUCOSE SERPL-MCNC: 125 MG/DL — HIGH (ref 70–99)
HCT VFR BLD CALC: 39.4 % — SIGNIFICANT CHANGE UP (ref 34.5–45)
HGB BLD-MCNC: 12.5 G/DL — SIGNIFICANT CHANGE UP (ref 11.5–15.5)
IMM GRANULOCYTES NFR BLD AUTO: 0.4 % — SIGNIFICANT CHANGE UP (ref 0–0.9)
LYMPHOCYTES # BLD AUTO: 0.82 K/UL — LOW (ref 1–3.3)
LYMPHOCYTES # BLD AUTO: 9 % — LOW (ref 13–44)
MAGNESIUM SERPL-MCNC: 1.8 MG/DL — SIGNIFICANT CHANGE UP (ref 1.6–2.6)
MCHC RBC-ENTMCNC: 26.5 PG — LOW (ref 27–34)
MCHC RBC-ENTMCNC: 31.7 G/DL — LOW (ref 32–36)
MCV RBC AUTO: 83.7 FL — SIGNIFICANT CHANGE UP (ref 80–100)
MONOCYTES # BLD AUTO: 0.23 K/UL — SIGNIFICANT CHANGE UP (ref 0–0.9)
MONOCYTES NFR BLD AUTO: 2.5 % — SIGNIFICANT CHANGE UP (ref 2–14)
NEUTROPHILS # BLD AUTO: 8 K/UL — HIGH (ref 1.8–7.4)
NEUTROPHILS NFR BLD AUTO: 87.7 % — HIGH (ref 43–77)
NRBC BLD AUTO-RTO: 0 /100 WBCS — SIGNIFICANT CHANGE UP (ref 0–0)
PHOSPHATE SERPL-MCNC: 3.4 MG/DL — SIGNIFICANT CHANGE UP (ref 2.5–4.5)
PLATELET # BLD AUTO: 215 K/UL — SIGNIFICANT CHANGE UP (ref 150–400)
POTASSIUM SERPL-MCNC: 4.8 MMOL/L — SIGNIFICANT CHANGE UP (ref 3.5–5.3)
POTASSIUM SERPL-SCNC: 4.8 MMOL/L — SIGNIFICANT CHANGE UP (ref 3.5–5.3)
PROT SERPL-MCNC: 7.4 G/DL — SIGNIFICANT CHANGE UP (ref 6–8.3)
RBC # BLD: 4.71 M/UL — SIGNIFICANT CHANGE UP (ref 3.8–5.2)
RBC # FLD: 15.3 % — HIGH (ref 10.3–14.5)
RH IG SCN BLD-IMP: POSITIVE — SIGNIFICANT CHANGE UP
SODIUM SERPL-SCNC: 138 MMOL/L — SIGNIFICANT CHANGE UP (ref 135–145)
TROPONIN T, HIGH SENSITIVITY RESULT: 14 NG/L — SIGNIFICANT CHANGE UP (ref 0–51)
TROPONIN T, HIGH SENSITIVITY RESULT: 20 NG/L — SIGNIFICANT CHANGE UP (ref 0–51)
WBC # BLD: 9.13 K/UL — SIGNIFICANT CHANGE UP (ref 3.8–10.5)
WBC # FLD AUTO: 9.13 K/UL — SIGNIFICANT CHANGE UP (ref 3.8–10.5)

## 2025-03-10 PROCEDURE — 99222 1ST HOSP IP/OBS MODERATE 55: CPT | Mod: GC

## 2025-03-10 PROCEDURE — 93010 ELECTROCARDIOGRAM REPORT: CPT | Mod: 76

## 2025-03-10 PROCEDURE — 43999 UNLISTED PROCEDURE STOMACH: CPT

## 2025-03-10 DEVICE — IMP ANCHOR TISSUE HELIX 165CMX2.8MM: Type: IMPLANTABLE DEVICE | Status: FUNCTIONAL

## 2025-03-10 DEVICE — SUT CINCH LONG: Type: IMPLANTABLE DEVICE | Status: FUNCTIONAL

## 2025-03-10 RX ORDER — ONDANSETRON HCL/PF 4 MG/2 ML
4 VIAL (ML) INJECTION EVERY 6 HOURS
Refills: 0 | Status: DISCONTINUED | OUTPATIENT
Start: 2025-03-10 | End: 2025-03-12

## 2025-03-10 RX ORDER — OXYCODONE HYDROCHLORIDE 30 MG/1
5 TABLET ORAL
Qty: 45 | Refills: 0
Start: 2025-03-10 | End: 2025-03-12

## 2025-03-10 RX ORDER — ACETAMINOPHEN 500 MG/5ML
20.3 LIQUID (ML) ORAL
Qty: 406 | Refills: 0
Start: 2025-03-10 | End: 2025-03-14

## 2025-03-10 RX ORDER — SERTRALINE 100 MG/1
50 TABLET, FILM COATED ORAL EVERY 24 HOURS
Refills: 0 | Status: DISCONTINUED | OUTPATIENT
Start: 2025-03-10 | End: 2025-03-10

## 2025-03-10 RX ORDER — ROSUVASTATIN CALCIUM 20 MG/1
10 TABLET, FILM COATED ORAL AT BEDTIME
Refills: 0 | Status: DISCONTINUED | OUTPATIENT
Start: 2025-03-10 | End: 2025-03-12

## 2025-03-10 RX ORDER — LABETALOL HYDROCHLORIDE 200 MG/1
5 TABLET, FILM COATED ORAL EVERY 8 HOURS
Refills: 0 | Status: DISCONTINUED | OUTPATIENT
Start: 2025-03-10 | End: 2025-03-10

## 2025-03-10 RX ORDER — SERTRALINE 100 MG/1
50 TABLET, FILM COATED ORAL EVERY 24 HOURS
Refills: 0 | Status: DISCONTINUED | OUTPATIENT
Start: 2025-03-10 | End: 2025-03-12

## 2025-03-10 RX ORDER — ACETAMINOPHEN 500 MG/5ML
650 LIQUID (ML) ORAL EVERY 6 HOURS
Refills: 0 | Status: DISCONTINUED | OUTPATIENT
Start: 2025-03-10 | End: 2025-03-12

## 2025-03-10 RX ORDER — ONDANSETRON HCL/PF 4 MG/2 ML
1 VIAL (ML) INJECTION
Qty: 56 | Refills: 0
Start: 2025-03-10 | End: 2025-03-23

## 2025-03-10 RX ORDER — OMEPRAZOLE 20 MG/1
1 CAPSULE, DELAYED RELEASE ORAL
Qty: 30 | Refills: 0
Start: 2025-03-10 | End: 2025-04-08

## 2025-03-10 RX ORDER — LABETALOL HYDROCHLORIDE 200 MG/1
5 TABLET, FILM COATED ORAL EVERY 8 HOURS
Refills: 0 | Status: DISCONTINUED | OUTPATIENT
Start: 2025-03-10 | End: 2025-03-11

## 2025-03-10 RX ORDER — CEFOTETAN DISODIUM 1 G
2 VIAL (EA) INJECTION ONCE
Refills: 0 | Status: DISCONTINUED | OUTPATIENT
Start: 2025-03-10 | End: 2025-03-10

## 2025-03-10 RX ADMIN — Medication 4 MILLIGRAM(S): at 22:42

## 2025-03-10 RX ADMIN — Medication 40 MILLIGRAM(S): at 17:32

## 2025-03-10 RX ADMIN — SERTRALINE 50 MILLIGRAM(S): 100 TABLET, FILM COATED ORAL at 21:22

## 2025-03-10 RX ADMIN — Medication 650 MILLIGRAM(S): at 17:32

## 2025-03-10 RX ADMIN — ROSUVASTATIN CALCIUM 10 MILLIGRAM(S): 20 TABLET, FILM COATED ORAL at 21:22

## 2025-03-10 RX ADMIN — Medication 4 MILLIGRAM(S): at 17:32

## 2025-03-10 NOTE — CHART NOTE - NSCHARTNOTEFT_GEN_A_CORE
Patient is a 79F PMHx of HTN on atenolol, preDM, possible angina/palpitations, and PSHx of R lumpectomy for right breast Ca now s/p endoscopic sleeve gastroplasty, now with hypertension refractory to IV Pushes of hydralazine and labetalol, admitted to medicine tele for further management of refractory hypertension.   Patient without abdominal pain, endorsing mild nausea with episode of spit up and generalized malaise.   Vitals with SBP between 160s-180s, otherwise afebrile, nontachy, satting well on RA.   On exam abd soft, nontender, nondistended.   Agree with monitoring on med tele for refractory hypertension, surgery will continue to follow. Patient should continue with daily PPI, zofran for nausea, and tylenol/oxy for pain control. No NSAIDs.  Discussed with attending and medicine team Patient is a 79F PMHx of HTN on atenolol, preDM, possible angina/palpitations, and PSHx of R lumpectomy for right breast Ca now s/p endoscopic sleeve gastroplasty, now with hypertension refractory to IV Pushes of hydralazine and labetalol, admitted to medicine tele for further management of refractory hypertension.   Patient without abdominal pain, endorsing mild nausea with episode of spit up and generalized malaise.   Vitals with SBP between 160s-180s, otherwise afebrile, nontachy, satting well on RA.   On exam abd soft, nontender, nondistended.   Agree with monitoring on med tele for refractory hypertension, surgery will continue to follow. Patient should continue with daily PPI, zofran for nausea, and tylenol/oxy for pain control. No NSAIDs. CLD x 1 week, then progression to fulls. Surgery Team 2C will continue to follow. Please page Team 2 with questions/clinical changes. [phone]   Discussed with attending and medicine team

## 2025-03-10 NOTE — H&P ADULT - NSHPSOCIALHISTORY_GEN_ALL_CORE
Current nonsmoker, stopped smoking in 1983, previous 23 year smoking history  Denies drug use  Reports social alcohol use when she goes out to dinner  Lives alone, independent in ADLs, walks without assistive device

## 2025-03-10 NOTE — H&P ADULT - NSHPPHYSICALEXAM_GEN_ALL_CORE
General: NC/AT, obese female lying in bed in NAD   HEENT:  NC/AT, EOMI, sclera anicteric, PERRL       Lungs: Bilateral BS  Cardiovascular: RRR, nl s1, s2, no m/r/g appreciated  Gastrointestinal: abdomen distended but soft and mildly tender to deep palpation diffusely, bowel sounds present  Musculoskeletal: No clubbing.  Moves all extremities.    Skin: Warm, dry, intact  Neurological: A&Ox3, strength 5/5 and sensation intact in all extremities

## 2025-03-10 NOTE — H&P ADULT - HISTORY OF PRESENT ILLNESS
HPI:    In the ED:  Initial vital signs: T: XX F, HR: XX, BP: XX, R: XX, SpO2: XX% on RA  Labs: significant for  CXR:   EKG:   Medications:   Consults: none  HPI: 79F PMH prediabetes, palpitations, and surgical history of right breast lumpectomy for tx breast CA who presents today for endoscopic gastroplasty. Surgery was completed with no reported complications. Post-operatively patient was found to have /79, HR 94 and otherwise hemodynamically stable in no significant distress. Complaining of nausea around that time and experienced 2 episodes emesis that were speckled with bloody phlegm. Otherwise denied severe abdominal pain, headaches, vision changes. While in endo recovery suite, was given labetalol 5 mg IVP x1 followed by 10 mg IVP x2 and SBP continued to fluctuate from 162-195. ICU consulted for hypertensive urgency, patient admitted to ICU Stepdown for further monitoring of Blood pressure.

## 2025-03-10 NOTE — H&P ADULT - ASSESSMENT
9F PMH prediabetes, palpitations (on atenolol PRN), and surgical history of right breast lumpectomy for tx breast CA who presents today for endoscopic gastroplasty, course c/b HTN urgency post-operatively likely in the setting of abdominal discomfort versus less likely cardiac etiology vs Selin Oates tear (lower suspicion given some degree of blood specked emesis is to be expected post operatively and no pepe blood/persistent retching/coffee ground emesis has been witnessed thus far). ICU consulted for HTN urgency     NEURO  #Anxiety/depression   home med: zoloft 50 mg qd   -Continue home medication as above     PULMONARY  GUSTAVO, breathing comfortably on room air     CARDIAC  #HTN Urgency   S/p endoscopic sleeve gastroplasty today and found to have N/V with SBP ranging 160-206 post-op with abdominal discomfort, otherwise asymptomatic (denies HA/vision changes/flank pain); suspect iso post-operative pain however given pain occasionally radiating to epigastric/chest area, will r/o cardiac etiology; no history of HTN; per pt SBP ranges 90-100s and takes atenolol as home med only for palpitations   - Recommend labetalol 5 mg IV PRN for SBP > 170 w/ hold parameters  -Will consider oral regimen based on BP trend   -CBC, CMP, Mg, Phos   -EKG  -Troponin   -Telemetry monitoring     #Palpitations   home med: atenolol 10 mg PO QD PRN; no active symptoms at this time   -Continue home medication     GASTROINTESTINAL  #S/p endoscopic sleeve gastroplasty   #N/V  Likely iso post-operative state (s/p endoscopic sleeve gastroplasty 3/10); 2 episodes vomiting (blood tinged with mucous) without pepe blood or coffee ground emesis; will require adequate pain control   -Zofran 4 mg IVP Q6H PRN for N/V  -PPI IV QD   -Surgery team will continue to follow. Recs appreciated   -CLD  -Pain control     RENAL  GUSTAVO     INFECTIOUS  Afebrile, no s/s infection    ENDOCRINE  -Q6H FGS    HEME  GUSTAVO    MISC  Fluids: none  Diet: CLD  Electrolytes: replete K>3.8, Mg>1.8, Phos>2.5 IV  DVT Prophylaxis: SCDs, chemoprophylaxis decision pending lab work   GI Prophylaxis: Pantoprazole 40 mg IV daily  Code Status: Full code  Dispo: Med telemetry    9F PMH prediabetes, palpitations (on atenolol PRN), and surgical history of right breast lumpectomy for tx breast CA who presents today for endoscopic gastroplasty, course c/b HTN urgency post-operatively likely in the setting of abdominal discomfort versus less likely cardiac etiology vs Selin Oates tear (lower suspicion given some degree of blood specked emesis is to be expected post operatively and no pepe blood/persistent retching/coffee ground emesis has been witnessed thus far). ICU consulted for HTN urgency     NEURO  #Anxiety/depression   home med: zoloft 50 mg qd   -Continue home medication as above     PULMONARY  GUSTAVO, breathing comfortably on room air     CARDIAC  #HTN Urgency   S/p endoscopic sleeve gastroplasty today and found to have N/V with SBP ranging 160-206 post-op with abdominal discomfort, otherwise asymptomatic (denies HA/vision changes/flank pain); suspect iso post-operative pain however given pain occasionally radiating to epigastric/chest area, will r/o cardiac etiology; no history of HTN; per pt SBP ranges 90-100s and takes atenolol as home med only for palpitations   - labetalol 5 mg IV PRN for SBP > 170 w/ hold parameters  -Will consider oral regimen based on BP trend   -CBC, CMP, Mg, Phos   -EKG  -Troponin   -Telemetry monitoring     #Palpitations   home med: atenolol 10 mg PO QD PRN; no active symptoms at this time   -Continue home medication     GASTROINTESTINAL  #S/p endoscopic sleeve gastroplasty   #N/V  Likely iso post-operative state (s/p endoscopic sleeve gastroplasty 3/10); 2 episodes vomiting (blood tinged with mucous) without pepe blood or coffee ground emesis; will require adequate pain control   -Zofran 4 mg IVP Q6H PRN for N/V  -PPI IV QD   -Surgery team will continue to follow. Recs appreciated   -CLD  -Pain control     RENAL  GUSTAVO     INFECTIOUS  Afebrile, no s/s infection    ENDOCRINE  -Q6H FGS    HEME  GUSTAVO    MISC  Fluids: none  Diet: CLD  Electrolytes: replete K>3.8, Mg>1.8, Phos>2.5 IV  DVT Prophylaxis: SCDs, chemoprophylaxis decision pending lab work   GI Prophylaxis: Pantoprazole 40 mg IV daily  Code Status: Full code  Dispo: Med telemetry    9F PMH prediabetes, palpitations (on atenolol PRN), and surgical history of right breast lumpectomy for tx breast CA who presents today for endoscopic gastroplasty, course c/b HTN urgency post-operatively likely in the setting of abdominal discomfort versus less likely cardiac etiology vs Selin Oates tear (lower suspicion given some degree of blood specked emesis is to be expected post operatively and no pepe blood/persistent retching/coffee ground emesis has been witnessed thus far). ICU consulted for HTN urgency     NEURO  #Anxiety/depression   home med: zoloft 50 mg qd   -Continue home medication as above     PULMONARY  GUSTAVO, breathing comfortably on room air     CARDIAC  #HTN Urgency   S/p endoscopic sleeve gastroplasty today and found to have N/V with SBP ranging 160-206 post-op with abdominal discomfort, otherwise asymptomatic (denies HA/vision changes/flank pain); suspect iso post-operative pain however given pain occasionally radiating to epigastric/chest area, will r/o cardiac etiology; no history of HTN; per pt SBP ranges 90-100s and takes atenolol as home med only for palpitations   - labetalol 5 mg IV PRN for SBP > 170 w/ hold parameters  -Will consider oral regimen based on BP trend   -CBC, CMP, Mg, Phos   -EKG  -Troponin   -Telemetry monitoring     #Palpitations   home med: atenolol 10 mg PO QD PRN; no active symptoms at this time   -holding home med    GASTROINTESTINAL  #S/p endoscopic sleeve gastroplasty   #N/V  Likely iso post-operative state (s/p endoscopic sleeve gastroplasty 3/10); 2 episodes vomiting (blood tinged with mucous) without pepe blood or coffee ground emesis; will require adequate pain control   -Zofran 4 mg IVP Q6H PRN for N/V  -PPI IV QD   -Surgery team will continue to follow. Recs appreciated   -CLD  -Pain control     RENAL  GUSTAVO     INFECTIOUS  Afebrile, no s/s infection    ENDOCRINE  -Q6H FGS    HEME  GUSTAVO    MISC  Fluids: none  Diet: CLD  Electrolytes: replete K>3.8, Mg>1.8, Phos>2.5 IV  DVT Prophylaxis: SCDs, chemoprophylaxis decision pending lab work   GI Prophylaxis: Pantoprazole 40 mg IV daily  Code Status: Full code  Dispo: Med telemetry

## 2025-03-10 NOTE — PACU DISCHARGE NOTE - COMMENTS
The patient was hypertensive in PACU, BP controlled with meds. 12 lead EKG done, unremarkable. The patient was evaluated by medicine service, admitted to telemetry for an observation.

## 2025-03-10 NOTE — CONSULT NOTE ADULT - ASSESSMENT
79F PMH prediabetes, palpitations (on atenolol PRN), and surgical history of right breast lumpectomy for tx breast CA who presents today for endoscopic gastroplasty, course c/b HTN urgency post-operatively likely in the setting of abdominal discomfort versus less likely cardiac etiology vs Selin Oates tear (lower suspicion given some degree of blood specked emesis is to be expected post operatively and no pepe blood/persistent retching/coffee ground emesis has been witnessed thus far). ICU consulted for HTN urgency     NEURO  #Anxiety/depression   home med: zoloft 50 mg qd   -Continue home medication as above     PULMONARY  GUSTAVO, breathing comfortably on room air     CARDIAC  #HTN Urgency   S/p endoscopic sleeve gastroplasty today and found to have N/V with SBP ranging 160-206 post-op with abdominal discomfort, otherwise asymptomatic (denies HA/vision changes/flank pain); suspect iso post-operative pain however given pain occasionally radiating to epigastric/chest area, will r/o cardiac etiology; no history of HTN; per pt SBP ranges 90-100s and takes atenolol as home med only for palpitations   - Recommend labetalol 5 mg IV PRN for SBP > 170 w/ hold parameters  -Will consider oral regimen based on BP trend   -CBC, CMP, Mg, Phos   -EKG  -Troponin   -Telemetry monitoring     #Palpitations   home med: atenolol 10 mg PO QD PRN; no active symptoms at this time   -Continue home medication     GASTROINTESTINAL  #S/p endoscopic sleeve gastroplasty   #N/V  Likely iso post-operative state (s/p endoscopic sleeve gastroplasty 3/10); 2 episodes vomiting (blood tinged with mucous) without pepe blood or coffee ground emesis; will require adequate pain control   -Zofran 4 mg IVP Q6H PRN for N/V  -PPI IV QD   -Surgery team will continue to follow. Recs appreciated   -CLD  -Pain control     RENAL  GUSTAVO     INFECTIOUS  Afebrile, no s/s infection    ENDOCRINE  -Q6H FGS    HEME  GUSTAVO    MISC  Fluids: none  Diet: CLD  Electrolytes: replete K>3.8, Mg>1.8, Phos>2.5 IV  DVT Prophylaxis: SCDs, chemoprophylaxis decision pending lab work   GI Prophylaxis: Pantoprazole 40 mg IV daily  Code Status: Full code  Dispo: Med telemetry     Discussed with intensivist Dr. Mayorga

## 2025-03-10 NOTE — PRE-ANESTHESIA EVALUATION ADULT - NSANTHPMHFT_GEN_ALL_CORE
79yoF with morbid obesity, breast cancer, depression, diverticulosis, presents for endoscopic gastric sleeve

## 2025-03-10 NOTE — PATIENT PROFILE ADULT - FALL HARM RISK - HARM RISK INTERVENTIONS

## 2025-03-10 NOTE — CONSULT NOTE ADULT - ATTENDING COMMENTS
Status post EGD with complaint of L sided discomfort and elevated BP status post labetolol. Will plan for cardiac workup with trops and EKG. will obtain cardiology if needed. BP likely secondary to discomfort will monitor and favor it may self resolve as she is usually low BP. Abdomen still distended from EGd and likely contributing.

## 2025-03-10 NOTE — CONSULT NOTE ADULT - SUBJECTIVE AND OBJECTIVE BOX
ICU CONSULT NOTE:     HPI: 79F PMH prediabetes, palpitations, and surgical history of right breast lumpectomy for tx breast CA who presents today for endoscopic gastroplasty. Surgery was completed with no reported complications. Post-operatively patient was found to have /79, HR 94 and otherwise hemodynamically stable in no significant distress. Complaining of nausea around that time and experienced 2 episodes emesis that were speckled with bloody phlegm. Otherwise denied severe abdominal pain, headaches, vision changes. While in endo recovery suite, was given labetalol 5 mg IVP x1 followed by 10 mg IVP x2 and SBP continued to fluctuate from 162-195. ICU consulted for hypertensive urgency.     SUBJECTIVE: Pt seen and examined at bedside. Reported mild abdominal discomfort that traveled to epigastric/chest area. Pt states her BP usually runs on the lower side at home SBP averaging 90-100s. Denies current N/V/SOB/pleuritic pain/diarrhea. 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  Overactive bladder  Depression  Neuropathy  Skin cancer  Diverticulosis  S/P lumpectomy, right breast    Home Medications:  zoloft 50 mg QD  rosuvastatin 10 mg QD  folic acid 1 mg QD  atenolol 10 mg QD PRN for palpitations       Allergies  Sudafed (Hives)      FAMILY HISTORY:  Family history of breast cancer in mother (Mother)    Family history of colon cancer (Father)  father    Family history of heart disease (Mother)  mother    PHYSICAL EXAM:  General: NC/AT, obese female lying in bed in NAD   HEENT:  NC/AT, EOMI, sclera anicteric, PERRL       Lungs: Bilateral BS  Cardiovascular: RRR, nl s1, s2, no m/r/g appreciated  Gastrointestinal: abdomen distended but soft and mildly tender to deep palpation diffusely, , bowel sounds present  Musculoskeletal: No clubbing.  Moves all extremities.    Skin: Warm, dry, intact  Neurological: A&Ox3, strength 5/5 and sensation intact in all extremities

## 2025-03-11 LAB
ALBUMIN SERPL ELPH-MCNC: 4 G/DL — SIGNIFICANT CHANGE UP (ref 3.3–5)
ALP SERPL-CCNC: 45 U/L — SIGNIFICANT CHANGE UP (ref 40–120)
ALT FLD-CCNC: 15 U/L — SIGNIFICANT CHANGE UP (ref 10–45)
ANION GAP SERPL CALC-SCNC: 13 MMOL/L — SIGNIFICANT CHANGE UP (ref 5–17)
AST SERPL-CCNC: 22 U/L — SIGNIFICANT CHANGE UP (ref 10–40)
BASOPHILS # BLD AUTO: 0.01 K/UL — SIGNIFICANT CHANGE UP (ref 0–0.2)
BASOPHILS NFR BLD AUTO: 0.1 % — SIGNIFICANT CHANGE UP (ref 0–2)
BILIRUB SERPL-MCNC: 0.4 MG/DL — SIGNIFICANT CHANGE UP (ref 0.2–1.2)
BUN SERPL-MCNC: 15 MG/DL — SIGNIFICANT CHANGE UP (ref 7–23)
CALCIUM SERPL-MCNC: 9.1 MG/DL — SIGNIFICANT CHANGE UP (ref 8.4–10.5)
CHLORIDE SERPL-SCNC: 103 MMOL/L — SIGNIFICANT CHANGE UP (ref 96–108)
CO2 SERPL-SCNC: 23 MMOL/L — SIGNIFICANT CHANGE UP (ref 22–31)
CREAT SERPL-MCNC: 0.83 MG/DL — SIGNIFICANT CHANGE UP (ref 0.5–1.3)
EGFR: 72 ML/MIN/1.73M2 — SIGNIFICANT CHANGE UP
EGFR: 72 ML/MIN/1.73M2 — SIGNIFICANT CHANGE UP
EOSINOPHIL # BLD AUTO: 0.01 K/UL — SIGNIFICANT CHANGE UP (ref 0–0.5)
EOSINOPHIL NFR BLD AUTO: 0.1 % — SIGNIFICANT CHANGE UP (ref 0–6)
GLUCOSE SERPL-MCNC: 131 MG/DL — HIGH (ref 70–99)
HCT VFR BLD CALC: 37 % — SIGNIFICANT CHANGE UP (ref 34.5–45)
HGB BLD-MCNC: 12.1 G/DL — SIGNIFICANT CHANGE UP (ref 11.5–15.5)
IMM GRANULOCYTES NFR BLD AUTO: 0.3 % — SIGNIFICANT CHANGE UP (ref 0–0.9)
LYMPHOCYTES # BLD AUTO: 1.08 K/UL — SIGNIFICANT CHANGE UP (ref 1–3.3)
LYMPHOCYTES # BLD AUTO: 11.2 % — LOW (ref 13–44)
MAGNESIUM SERPL-MCNC: 2.1 MG/DL — SIGNIFICANT CHANGE UP (ref 1.6–2.6)
MCHC RBC-ENTMCNC: 26.9 PG — LOW (ref 27–34)
MCHC RBC-ENTMCNC: 32.7 G/DL — SIGNIFICANT CHANGE UP (ref 32–36)
MCV RBC AUTO: 82.4 FL — SIGNIFICANT CHANGE UP (ref 80–100)
MONOCYTES # BLD AUTO: 0.92 K/UL — HIGH (ref 0–0.9)
MONOCYTES NFR BLD AUTO: 9.6 % — SIGNIFICANT CHANGE UP (ref 2–14)
NEUTROPHILS # BLD AUTO: 7.58 K/UL — HIGH (ref 1.8–7.4)
NEUTROPHILS NFR BLD AUTO: 78.7 % — HIGH (ref 43–77)
NRBC BLD AUTO-RTO: 0 /100 WBCS — SIGNIFICANT CHANGE UP (ref 0–0)
PHOSPHATE SERPL-MCNC: 2.9 MG/DL — SIGNIFICANT CHANGE UP (ref 2.5–4.5)
PLATELET # BLD AUTO: 228 K/UL — SIGNIFICANT CHANGE UP (ref 150–400)
POTASSIUM SERPL-MCNC: 3.7 MMOL/L — SIGNIFICANT CHANGE UP (ref 3.5–5.3)
POTASSIUM SERPL-SCNC: 3.7 MMOL/L — SIGNIFICANT CHANGE UP (ref 3.5–5.3)
PROT SERPL-MCNC: 7.3 G/DL — SIGNIFICANT CHANGE UP (ref 6–8.3)
RBC # BLD: 4.49 M/UL — SIGNIFICANT CHANGE UP (ref 3.8–5.2)
RBC # FLD: 15.3 % — HIGH (ref 10.3–14.5)
SODIUM SERPL-SCNC: 139 MMOL/L — SIGNIFICANT CHANGE UP (ref 135–145)
WBC # BLD: 9.63 K/UL — SIGNIFICANT CHANGE UP (ref 3.8–10.5)
WBC # FLD AUTO: 9.63 K/UL — SIGNIFICANT CHANGE UP (ref 3.8–10.5)

## 2025-03-11 PROCEDURE — 99233 SBSQ HOSP IP/OBS HIGH 50: CPT | Mod: GC

## 2025-03-11 PROCEDURE — 93010 ELECTROCARDIOGRAM REPORT: CPT

## 2025-03-11 RX ORDER — NIFEDIPINE 30 MG
30 TABLET, EXTENDED RELEASE 24 HR ORAL EVERY 24 HOURS
Refills: 0 | Status: DISCONTINUED | OUTPATIENT
Start: 2025-03-11 | End: 2025-03-11

## 2025-03-11 RX ORDER — NIFEDIPINE 30 MG
30 TABLET, EXTENDED RELEASE 24 HR ORAL ONCE
Refills: 0 | Status: COMPLETED | OUTPATIENT
Start: 2025-03-11 | End: 2025-03-11

## 2025-03-11 RX ORDER — NIFEDIPINE 30 MG
30 TABLET, EXTENDED RELEASE 24 HR ORAL EVERY 12 HOURS
Refills: 0 | Status: DISCONTINUED | OUTPATIENT
Start: 2025-03-12 | End: 2025-03-12

## 2025-03-11 RX ORDER — FUROSEMIDE 10 MG/ML
20 INJECTION INTRAMUSCULAR; INTRAVENOUS ONCE
Refills: 0 | Status: COMPLETED | OUTPATIENT
Start: 2025-03-11 | End: 2025-03-11

## 2025-03-11 RX ORDER — MELATONIN 5 MG
5 TABLET ORAL AT BEDTIME
Refills: 0 | Status: DISCONTINUED | OUTPATIENT
Start: 2025-03-11 | End: 2025-03-12

## 2025-03-11 RX ORDER — LISINOPRIL 30 MG/1
25 TABLET ORAL ONCE
Refills: 0 | Status: COMPLETED | OUTPATIENT
Start: 2025-03-11 | End: 2025-03-11

## 2025-03-11 RX ORDER — NIFEDIPINE 30 MG
30 TABLET, EXTENDED RELEASE 24 HR ORAL ONCE
Refills: 0 | Status: DISCONTINUED | OUTPATIENT
Start: 2025-03-11 | End: 2025-03-11

## 2025-03-11 RX ADMIN — Medication 30 MILLIGRAM(S): at 09:49

## 2025-03-11 RX ADMIN — Medication 5 MILLIGRAM(S): at 21:56

## 2025-03-11 RX ADMIN — SERTRALINE 50 MILLIGRAM(S): 100 TABLET, FILM COATED ORAL at 21:56

## 2025-03-11 RX ADMIN — LISINOPRIL 25 MILLIGRAM(S): 30 TABLET ORAL at 15:39

## 2025-03-11 RX ADMIN — FUROSEMIDE 20 MILLIGRAM(S): 10 INJECTION INTRAMUSCULAR; INTRAVENOUS at 21:56

## 2025-03-11 RX ADMIN — Medication 30 MILLIGRAM(S): at 12:23

## 2025-03-11 RX ADMIN — ROSUVASTATIN CALCIUM 10 MILLIGRAM(S): 20 TABLET, FILM COATED ORAL at 21:56

## 2025-03-11 RX ADMIN — Medication 30 MILLIGRAM(S): at 16:45

## 2025-03-11 NOTE — PROGRESS NOTE ADULT - SUBJECTIVE AND OBJECTIVE BOX
SUBJECTIVE: Patient reports mild spit up over night, but nausea is improving. Reports feeling better.    MEDICATIONS  (STANDING):  acetaminophen   Oral Liquid .. 650 milliGRAM(s) Oral every 6 hours  pantoprazole  Injectable 40 milliGRAM(s) IV Push daily  rosuvastatin 10 milliGRAM(s) Oral at bedtime  sertraline 50 milliGRAM(s) Oral every 24 hours    MEDICATIONS  (PRN):  labetalol Injectable 5 milliGRAM(s) IV Push every 8 hours PRN SBP > 160 mmhg  ondansetron Injectable 4 milliGRAM(s) IV Push every 6 hours PRN Nausea      Vital Signs Last 24 Hrs  T(C): 36.9 (11 Mar 2025 05:43), Max: 36.9 (11 Mar 2025 05:43)  T(F): 98.4 (11 Mar 2025 05:43), Max: 98.4 (11 Mar 2025 05:43)  HR: 82 (11 Mar 2025 04:27) (76 - 90)  BP: 173/72 (11 Mar 2025 04:27) (152/68 - 196/79)  BP(mean): 104 (11 Mar 2025 04:27) (98 - 114)  RR: 16 (11 Mar 2025 04:27) (16 - 18)  SpO2: 96% (11 Mar 2025 04:27) (93% - 97%)    Parameters below as of 11 Mar 2025 04:27  Patient On (Oxygen Delivery Method): room air        PHYSICAL EXAM:    Constitutional: A&Ox3, nad  Respiratory: non labored breathing, no respiratory distress  Cardiovascular: NSR, RRR  Gastrointestinal: abd soft, NT, ND  Genitourinary: voiding  Extremities: (-) edema, wwp, SCDs in place              I&O's Detail    10 Mar 2025 07:01  -  11 Mar 2025 06:51  --------------------------------------------------------  IN:  Total IN: 0 mL    OUT:    Voided (mL): 1080 mL  Total OUT: 1080 mL    Total NET: -1080 mL          LABS:                        12.5   9.13  )-----------( 215      ( 10 Mar 2025 20:28 )             39.4     03-10    138  |  103  |  14  ----------------------------<  125[H]  4.8   |  22  |  0.78    Ca    8.6      10 Mar 2025 20:28  Phos  3.4     03-10  Mg     1.8     03-10    TPro  7.4  /  Alb  3.9  /  TBili  0.3  /  DBili  x   /  AST  20  /  ALT  16  /  AlkPhos  44  03-10      Urinalysis Basic - ( 10 Mar 2025 20:28 )    Color: x / Appearance: x / SG: x / pH: x  Gluc: 125 mg/dL / Ketone: x  / Bili: x / Urobili: x   Blood: x / Protein: x / Nitrite: x   Leuk Esterase: x / RBC: x / WBC x   Sq Epi: x / Non Sq Epi: x / Bacteria: x        RADIOLOGY & ADDITIONAL STUDIES:

## 2025-03-11 NOTE — PROGRESS NOTE ADULT - ASSESSMENT
Patient is a 79F PMHx of HTN on atenolol, preDM, possible angina/palpitations, and PSHx of R lumpectomy for right breast Ca now s/p endoscopic sleeve gastroplasty, now with hypertension refractory to IV Pushes of hydralazine and labetalol, admitted to medicine tele for further management of refractory hypertension.     Recs:  rest of care per primary team Patient is a 79F PMHx of HTN on atenolol, preDM, possible angina/palpitations, and PSHx of R lumpectomy for right breast Ca now s/p endoscopic sleeve gastroplasty, now with hypertension refractory to IV Pushes of hydralazine and labetalol, admitted to medicine tele for further management of refractory hypertension.     Recs:  if HTN under control, ok to dc   rest of care per primary team

## 2025-03-11 NOTE — PROGRESS NOTE ADULT - SUBJECTIVE AND OBJECTIVE BOX
OVERNIGHT EVENTS:    SUBJECTIVE / INTERVAL HPI: Patient seen and examined at bedside.     VITAL SIGNS:  Vital Signs Last 24 Hrs  T(C): 36.9 (11 Mar 2025 05:43), Max: 36.9 (11 Mar 2025 05:43)  T(F): 98.4 (11 Mar 2025 05:43), Max: 98.4 (11 Mar 2025 05:43)  HR: 82 (11 Mar 2025 04:27) (76 - 90)  BP: 173/72 (11 Mar 2025 04:27) (152/68 - 196/79)  BP(mean): 104 (11 Mar 2025 04:27) (98 - 114)  RR: 16 (11 Mar 2025 04:27) (16 - 18)  SpO2: 96% (11 Mar 2025 04:27) (93% - 97%)    Parameters below as of 11 Mar 2025 04:27  Patient On (Oxygen Delivery Method): room air      I&O's Summary    10 Mar 2025 07:01  -  11 Mar 2025 06:13  --------------------------------------------------------  IN: 0 mL / OUT: 1080 mL / NET: -1080 mL        PHYSICAL EXAM:    General: WDWN  HEENT: NC/AT; PERRL, anicteric sclera; MMM  Neck: supple  Cardiovascular: +S1/S2; RRR  Respiratory: CTA B/L; no W/R/R  Gastrointestinal: soft, NT/ND; +BSx4  Extremities: WWP; no edema, clubbing or cyanosis  Vascular: 2+ radial, DP/PT pulses B/L  Neurological: AAOx3; no focal deficits    MEDICATIONS:  MEDICATIONS  (STANDING):  acetaminophen   Oral Liquid .. 650 milliGRAM(s) Oral every 6 hours  pantoprazole  Injectable 40 milliGRAM(s) IV Push daily  rosuvastatin 10 milliGRAM(s) Oral at bedtime  sertraline 50 milliGRAM(s) Oral every 24 hours    MEDICATIONS  (PRN):  labetalol Injectable 5 milliGRAM(s) IV Push every 8 hours PRN SBP > 160 mmhg  ondansetron Injectable 4 milliGRAM(s) IV Push every 6 hours PRN Nausea      ALLERGIES:  Allergies    Sudafed (Hives)    Intolerances        LABS:                        12.5   9.13  )-----------( 215      ( 10 Mar 2025 20:28 )             39.4     03-10    138  |  103  |  14  ----------------------------<  125[H]  4.8   |  22  |  0.78    Ca    8.6      10 Mar 2025 20:28  Phos  3.4     03-10  Mg     1.8     03-10    TPro  7.4  /  Alb  3.9  /  TBili  0.3  /  DBili  x   /  AST  20  /  ALT  16  /  AlkPhos  44  03-10      Urinalysis Basic - ( 10 Mar 2025 20:28 )    Color: x / Appearance: x / SG: x / pH: x  Gluc: 125 mg/dL / Ketone: x  / Bili: x / Urobili: x   Blood: x / Protein: x / Nitrite: x   Leuk Esterase: x / RBC: x / WBC x   Sq Epi: x / Non Sq Epi: x / Bacteria: x      CAPILLARY BLOOD GLUCOSE          RADIOLOGY & ADDITIONAL TESTS: Reviewed.   OVERNIGHT EVENTS: 12: 15 AM vomiting (minimal tea color no blood on tissue), abdomen soft minimal tenderness SBP 190s (from 150s earlier) will recheck in 15 mins SBP 160s asymptomatics (held off on meds)evaluated by surgery ovn ok to have clear liquids but limited amount to avoid emesis      SUBJECTIVE / INTERVAL HPI: Patient seen and examined at bedside. She reports that she is adamant on going home despite her elevated blood pressures. She states she doesn't have a history of high blood pressure and her blood pressure is usually low. Otherwise, denies chest pain, SOB, abdominal pain, dizziness, lightheadedness, fevers, chills.     VITAL SIGNS:  Vital Signs Last 24 Hrs  T(C): 36.9 (11 Mar 2025 05:43), Max: 36.9 (11 Mar 2025 05:43)  T(F): 98.4 (11 Mar 2025 05:43), Max: 98.4 (11 Mar 2025 05:43)  HR: 82 (11 Mar 2025 04:27) (76 - 90)  BP: 173/72 (11 Mar 2025 04:27) (152/68 - 196/79)  BP(mean): 104 (11 Mar 2025 04:27) (98 - 114)  RR: 16 (11 Mar 2025 04:27) (16 - 18)  SpO2: 96% (11 Mar 2025 04:27) (93% - 97%)    Parameters below as of 11 Mar 2025 04:27  Patient On (Oxygen Delivery Method): room air      I&O's Summary    10 Mar 2025 07:01  -  11 Mar 2025 06:13  --------------------------------------------------------  IN: 0 mL / OUT: 1080 mL / NET: -1080 mL        PHYSICAL EXAM:    General: resting comfortably, in no acute distress   HEENT:  NC/AT, EOMI, sclera anicteric, PERRL       Lungs: Bilateral BS  Cardiovascular: RRR, nl s1, s2, no m/r/g appreciated  Gastrointestinal: abdomen distended but soft and mildly tender to deep palpation diffusely, bowel sounds present  Musculoskeletal: No clubbing.  Moves all extremities.    Skin: Warm, dry, intact  Neurological: A&Ox3, strength 5/5 and sensation intact in all extremities    MEDICATIONS:  MEDICATIONS  (STANDING):  acetaminophen   Oral Liquid .. 650 milliGRAM(s) Oral every 6 hours  pantoprazole  Injectable 40 milliGRAM(s) IV Push daily  rosuvastatin 10 milliGRAM(s) Oral at bedtime  sertraline 50 milliGRAM(s) Oral every 24 hours    MEDICATIONS  (PRN):  labetalol Injectable 5 milliGRAM(s) IV Push every 8 hours PRN SBP > 160 mmhg  ondansetron Injectable 4 milliGRAM(s) IV Push every 6 hours PRN Nausea      ALLERGIES:  Allergies    Sudafed (Hives)    Intolerances        LABS:                        12.5   9.13  )-----------( 215      ( 10 Mar 2025 20:28 )             39.4     03-10    138  |  103  |  14  ----------------------------<  125[H]  4.8   |  22  |  0.78    Ca    8.6      10 Mar 2025 20:28  Phos  3.4     03-10  Mg     1.8     03-10    TPro  7.4  /  Alb  3.9  /  TBili  0.3  /  DBili  x   /  AST  20  /  ALT  16  /  AlkPhos  44  03-10      Urinalysis Basic - ( 10 Mar 2025 20:28 )    Color: x / Appearance: x / SG: x / pH: x  Gluc: 125 mg/dL / Ketone: x  / Bili: x / Urobili: x   Blood: x / Protein: x / Nitrite: x   Leuk Esterase: x / RBC: x / WBC x   Sq Epi: x / Non Sq Epi: x / Bacteria: x      CAPILLARY BLOOD GLUCOSE          RADIOLOGY & ADDITIONAL TESTS: Reviewed.

## 2025-03-11 NOTE — PROGRESS NOTE ADULT - ASSESSMENT
9F PMH prediabetes, palpitations (on atenolol PRN), and surgical history of right breast lumpectomy for tx breast CA who presents today for endoscopic gastroplasty, course c/b HTN urgency post-operatively likely in the setting of abdominal discomfort versus less likely cardiac etiology vs Selin Oates tear (lower suspicion given some degree of blood specked emesis is to be expected post operatively and no pepe blood/persistent retching/coffee ground emesis has been witnessed thus far). ICU consulted for HTN urgency     NEURO  #Anxiety/depression   home med: zoloft 50 mg qd   -Continue home medication as above     PULMONARY  GUSTAVO, breathing comfortably on room air     CARDIAC  #HTN Urgency   S/p endoscopic sleeve gastroplasty today and found to have N/V with SBP ranging 160-206 post-op with abdominal discomfort, otherwise asymptomatic (denies HA/vision changes/flank pain); suspect iso post-operative pain however given pain occasionally radiating to epigastric/chest area, will r/o cardiac etiology; no history of HTN; per pt SBP ranges 90-100s and takes atenolol as home med only for palpitations   - labetalol 5 mg IV PRN for SBP > 170 w/ hold parameters  -Will consider oral regimen based on BP trend   -CBC, CMP, Mg, Phos   -EKG  -Troponin   -Telemetry monitoring     #Palpitations   home med: atenolol 10 mg PO QD PRN; no active symptoms at this time   -holding home med    GASTROINTESTINAL  #S/p endoscopic sleeve gastroplasty   #N/V  Likely iso post-operative state (s/p endoscopic sleeve gastroplasty 3/10); 2 episodes vomiting (blood tinged with mucous) without pepe blood or coffee ground emesis; will require adequate pain control   -Zofran 4 mg IVP Q6H PRN for N/V  -PPI IV QD   -Surgery team will continue to follow. Recs appreciated   -CLD  -Pain control     RENAL  GUSTAVO     INFECTIOUS  Afebrile, no s/s infection    ENDOCRINE  -Q6H FGS    HEME  GUSTAVO    MISC  Fluids: none  Diet: CLD  Electrolytes: replete K>3.8, Mg>1.8, Phos>2.5 IV  DVT Prophylaxis: SCDs, chemoprophylaxis decision pending lab work   GI Prophylaxis: Pantoprazole 40 mg IV daily  Code Status: Full code  Dispo: Med telemetry    9F PMH prediabetes, palpitations (on atenolol PRN), and surgical history of right breast lumpectomy for tx breast CA who presents today for endoscopic gastroplasty, course c/b HTN urgency post-operatively likely in the setting of abdominal discomfort versus less likely cardiac etiology vs Selin Oates tear (lower suspicion given some degree of blood specked emesis is to be expected post operatively and no pepe blood/persistent retching/coffee ground emesis has been witnessed thus far). ICU consulted for HTN urgency     NEURO  #Anxiety/depression   home med: zoloft 50 mg qd   -Continue home medication as above     PULMONARY  GUSTAVO, breathing comfortably on room air     CARDIAC  #HTN Urgency   S/p endoscopic sleeve gastroplasty today and found to have N/V with SBP ranging 160-206 post-op with abdominal discomfort, otherwise asymptomatic (denies HA/vision changes/flank pain); suspect iso post-operative pain however given pain occasionally radiating to epigastric/chest area, will r/o cardiac etiology; no history of HTN; per pt SBP ranges 90-100s and takes atenolol as home med only for palpitations   -Trops negative  -EKG with stable T wave inversion III, q waves in III  - labetalol 5 mg IV PRN for SBP > 170 w/ hold parameters  -transition to nifedipine PO    #Palpitations   home med: atenolol 10 mg PO QD PRN; no active symptoms at this time   -resume home med, will restart given abrupt withdrawal of atenolol may lead to sympathetic hyper-response and can be contributing to HTN    GASTROINTESTINAL  #S/p endoscopic sleeve gastroplasty   #N/V  Likely iso post-operative state (s/p endoscopic sleeve gastroplasty 3/10); 2 episodes vomiting (blood tinged with mucous) without pepe blood or coffee ground emesis; will require adequate pain control   -Zofran 4 mg IVP Q6H PRN for N/V  -PPI IV QD   -Surgery team will continue to follow. Recs appreciated   -CLD  -Pain control     RENAL  GUSTAVO     INFECTIOUS  Afebrile, no s/s infection    ENDOCRINE  -Q6H FGS    HEME  GUSTAVO    MISC  Fluids: none  Diet: CLD  Electrolytes: replete K>3.8, Mg>1.8, Phos>2.5 IV  DVT Prophylaxis: SCDs, chemoprophylaxis decision pending lab work   GI Prophylaxis: Pantoprazole 40 mg IV daily  Code Status: Full code  Dispo: Med telemetry

## 2025-03-12 ENCOUNTER — TRANSCRIPTION ENCOUNTER (OUTPATIENT)
Age: 80
End: 2025-03-12

## 2025-03-12 VITALS — TEMPERATURE: 98 F

## 2025-03-12 LAB
ALBUMIN SERPL ELPH-MCNC: 4.1 G/DL — SIGNIFICANT CHANGE UP (ref 3.3–5)
ALP SERPL-CCNC: 49 U/L — SIGNIFICANT CHANGE UP (ref 40–120)
ALT FLD-CCNC: 18 U/L — SIGNIFICANT CHANGE UP (ref 10–45)
ANION GAP SERPL CALC-SCNC: 12 MMOL/L — SIGNIFICANT CHANGE UP (ref 5–17)
AST SERPL-CCNC: 32 U/L — SIGNIFICANT CHANGE UP (ref 10–40)
BASOPHILS # BLD AUTO: 0.03 K/UL — SIGNIFICANT CHANGE UP (ref 0–0.2)
BASOPHILS NFR BLD AUTO: 0.3 % — SIGNIFICANT CHANGE UP (ref 0–2)
BILIRUB SERPL-MCNC: 0.4 MG/DL — SIGNIFICANT CHANGE UP (ref 0.2–1.2)
BUN SERPL-MCNC: 16 MG/DL — SIGNIFICANT CHANGE UP (ref 7–23)
CALCIUM SERPL-MCNC: 9.5 MG/DL — SIGNIFICANT CHANGE UP (ref 8.4–10.5)
CHLORIDE SERPL-SCNC: 101 MMOL/L — SIGNIFICANT CHANGE UP (ref 96–108)
CO2 SERPL-SCNC: 24 MMOL/L — SIGNIFICANT CHANGE UP (ref 22–31)
CREAT SERPL-MCNC: 0.77 MG/DL — SIGNIFICANT CHANGE UP (ref 0.5–1.3)
EGFR: 78 ML/MIN/1.73M2 — SIGNIFICANT CHANGE UP
EGFR: 78 ML/MIN/1.73M2 — SIGNIFICANT CHANGE UP
EOSINOPHIL # BLD AUTO: 0.03 K/UL — SIGNIFICANT CHANGE UP (ref 0–0.5)
EOSINOPHIL NFR BLD AUTO: 0.3 % — SIGNIFICANT CHANGE UP (ref 0–6)
GLUCOSE SERPL-MCNC: 126 MG/DL — HIGH (ref 70–99)
HCT VFR BLD CALC: 41.6 % — SIGNIFICANT CHANGE UP (ref 34.5–45)
HGB BLD-MCNC: 13.5 G/DL — SIGNIFICANT CHANGE UP (ref 11.5–15.5)
IMM GRANULOCYTES NFR BLD AUTO: 0.5 % — SIGNIFICANT CHANGE UP (ref 0–0.9)
LYMPHOCYTES # BLD AUTO: 1.24 K/UL — SIGNIFICANT CHANGE UP (ref 1–3.3)
LYMPHOCYTES # BLD AUTO: 14.2 % — SIGNIFICANT CHANGE UP (ref 13–44)
MAGNESIUM SERPL-MCNC: 2.4 MG/DL — SIGNIFICANT CHANGE UP (ref 1.6–2.6)
MCHC RBC-ENTMCNC: 27.1 PG — SIGNIFICANT CHANGE UP (ref 27–34)
MCHC RBC-ENTMCNC: 32.5 G/DL — SIGNIFICANT CHANGE UP (ref 32–36)
MCV RBC AUTO: 83.4 FL — SIGNIFICANT CHANGE UP (ref 80–100)
MONOCYTES # BLD AUTO: 0.81 K/UL — SIGNIFICANT CHANGE UP (ref 0–0.9)
MONOCYTES NFR BLD AUTO: 9.3 % — SIGNIFICANT CHANGE UP (ref 2–14)
NEUTROPHILS # BLD AUTO: 6.57 K/UL — SIGNIFICANT CHANGE UP (ref 1.8–7.4)
NEUTROPHILS NFR BLD AUTO: 75.4 % — SIGNIFICANT CHANGE UP (ref 43–77)
NRBC BLD AUTO-RTO: 0 /100 WBCS — SIGNIFICANT CHANGE UP (ref 0–0)
PHOSPHATE SERPL-MCNC: 3.5 MG/DL — SIGNIFICANT CHANGE UP (ref 2.5–4.5)
PLATELET # BLD AUTO: 212 K/UL — SIGNIFICANT CHANGE UP (ref 150–400)
POTASSIUM SERPL-MCNC: 4.1 MMOL/L — SIGNIFICANT CHANGE UP (ref 3.5–5.3)
POTASSIUM SERPL-SCNC: 4.1 MMOL/L — SIGNIFICANT CHANGE UP (ref 3.5–5.3)
PROT SERPL-MCNC: 8 G/DL — SIGNIFICANT CHANGE UP (ref 6–8.3)
RBC # BLD: 4.99 M/UL — SIGNIFICANT CHANGE UP (ref 3.8–5.2)
RBC # FLD: 15.6 % — HIGH (ref 10.3–14.5)
SODIUM SERPL-SCNC: 137 MMOL/L — SIGNIFICANT CHANGE UP (ref 135–145)
WBC # BLD: 8.72 K/UL — SIGNIFICANT CHANGE UP (ref 3.8–10.5)
WBC # FLD AUTO: 8.72 K/UL — SIGNIFICANT CHANGE UP (ref 3.8–10.5)

## 2025-03-12 PROCEDURE — 86900 BLOOD TYPING SEROLOGIC ABO: CPT

## 2025-03-12 PROCEDURE — 86901 BLOOD TYPING SEROLOGIC RH(D): CPT

## 2025-03-12 PROCEDURE — 82550 ASSAY OF CK (CPK): CPT

## 2025-03-12 PROCEDURE — 83880 ASSAY OF NATRIURETIC PEPTIDE: CPT

## 2025-03-12 PROCEDURE — 82553 CREATINE MB FRACTION: CPT

## 2025-03-12 PROCEDURE — 36415 COLL VENOUS BLD VENIPUNCTURE: CPT

## 2025-03-12 PROCEDURE — 83036 HEMOGLOBIN GLYCOSYLATED A1C: CPT

## 2025-03-12 PROCEDURE — C9399: CPT

## 2025-03-12 PROCEDURE — C1889: CPT

## 2025-03-12 PROCEDURE — 99232 SBSQ HOSP IP/OBS MODERATE 35: CPT | Mod: GC

## 2025-03-12 PROCEDURE — 84484 ASSAY OF TROPONIN QUANT: CPT

## 2025-03-12 PROCEDURE — 85025 COMPLETE CBC W/AUTO DIFF WBC: CPT

## 2025-03-12 PROCEDURE — 93005 ELECTROCARDIOGRAM TRACING: CPT

## 2025-03-12 PROCEDURE — 80053 COMPREHEN METABOLIC PANEL: CPT

## 2025-03-12 PROCEDURE — 86850 RBC ANTIBODY SCREEN: CPT

## 2025-03-12 PROCEDURE — 84100 ASSAY OF PHOSPHORUS: CPT

## 2025-03-12 PROCEDURE — 83735 ASSAY OF MAGNESIUM: CPT

## 2025-03-12 RX ORDER — NIFEDIPINE 30 MG
1 TABLET, EXTENDED RELEASE 24 HR ORAL
Qty: 0 | Refills: 0 | DISCHARGE
Start: 2025-03-12

## 2025-03-12 RX ORDER — ONDANSETRON HCL/PF 4 MG/2 ML
1 VIAL (ML) INJECTION
Qty: 3 | Refills: 0
Start: 2025-03-12 | End: 2025-03-25

## 2025-03-12 RX ORDER — FUROSEMIDE 10 MG/ML
1 INJECTION INTRAMUSCULAR; INTRAVENOUS
Qty: 14 | Refills: 0
Start: 2025-03-12 | End: 2025-03-25

## 2025-03-12 RX ORDER — FUROSEMIDE 10 MG/ML
40 INJECTION INTRAMUSCULAR; INTRAVENOUS ONCE
Refills: 0 | Status: COMPLETED | OUTPATIENT
Start: 2025-03-12 | End: 2025-03-12

## 2025-03-12 RX ORDER — NIFEDIPINE 30 MG
1 TABLET, EXTENDED RELEASE 24 HR ORAL
Qty: 14 | Refills: 0
Start: 2025-03-12 | End: 2025-03-25

## 2025-03-12 RX ORDER — CHLORTHALIDONE 25 MG/1
1 TABLET ORAL
Qty: 14 | Refills: 0
Start: 2025-03-12 | End: 2025-03-25

## 2025-03-12 RX ORDER — FUROSEMIDE 10 MG/ML
20 INJECTION INTRAMUSCULAR; INTRAVENOUS ONCE
Refills: 0 | Status: DISCONTINUED | OUTPATIENT
Start: 2025-03-12 | End: 2025-03-12

## 2025-03-12 RX ADMIN — FUROSEMIDE 40 MILLIGRAM(S): 10 INJECTION INTRAMUSCULAR; INTRAVENOUS at 12:38

## 2025-03-12 RX ADMIN — Medication 30 MILLIGRAM(S): at 05:38

## 2025-03-12 RX ADMIN — Medication 40 MILLIGRAM(S): at 12:39

## 2025-03-12 NOTE — DISCHARGE NOTE NURSING/CASE MANAGEMENT/SOCIAL WORK - PATIENT PORTAL LINK FT
You can access the FollowMyHealth Patient Portal offered by [facility] by registering at the following website: [email or web address]. By joining [facility]’s FollowMyHealth portal, you will also be able to view your health information using other applications (apps) compatible with our system.

## 2025-03-12 NOTE — DISCHARGE NOTE PROVIDER - NSDCCPTREATMENT_GEN_ALL_CORE_FT
PRINCIPAL PROCEDURE  Procedure: Endoscopic sleeve gastroplasty  Findings and Treatment: Clear Liquid diet days 1-3 post op  Full Liquid diet: Days 4-7 post op  Soft Food Diet: Day 8 post op.   avoid caffeine/ carbonated beverages, and chewing   Clear liquid diet: broth soups, apple/grape/cranberry juice/jello/ice pops/Full Liquid diet: milk, cream of wheat, strained cream soups, ice cream, sherbert, plain yogurt, meal replacements ( Ensure, Boost, etc)Soft foods: fish, scrambled eggs, oatmeal, baked or mashed potatoes, crackers, soft rice, pasta, steamed or pureed vegetables.

## 2025-03-12 NOTE — DISCHARGE NOTE PROVIDER - NSDCMRMEDTOKEN_GEN_ALL_CORE_FT
acetaminophen 650 mg/20.3 mL oral liquid: 20.3 milliliter(s) orally every 6 hours  Align 4 mg oral capsule: 1 cap(s) orally once a day in morning  biotin 5000 mcg oral tablet, disintegratin tab(s) orally once a day in morning - last dose 2016  Cytoxan: 175 milligram(s)    Levsin 0.125 mg oral tablet: 1 tab(s) orally , As Needed  omeprazole 40 mg oral delayed release capsule: 1 cap(s) orally once a day  ondansetron 4 mg oral tablet, disintegratin tab(s) orally every 6 hours as needed for -for nausea MDD:4 tabs  oxyCODONE 5 mg/5 mL oral solution: 5 milliliter(s) orally every 8 hours as needed for - for severe pain MDD: 15mL  Tylenol Extra Strength  mg-25 mg oral tablet: 2 tab(s) orally once a day (at bedtime)  Valium 5 mg oral tablet: 1 tab(s) orally 4 times a day, As Needed  Zoloft 50 mg oral tablet: 1 tab(s) orally once a day (at bedtime)  ZyrTEC: 1 tab(s) orally , As Needed   Align 4 mg oral capsule: 1 cap(s) orally once a day in morning  biotin 5000 mcg oral tablet, disintegratin tab(s) orally once a day in morning - last dose 2016  chlorthalidone 15 mg oral tablet: 1 tab(s) orally once a day (in the morning) DO NOT TAKE if you feel lightheaded or dizzy  Cytoxan: 175 milligram(s)    Levsin 0.125 mg oral tablet: 1 tab(s) orally , As Needed  NIFEdipine 30 mg oral tablet, extended release: 1 tab(s) orally every 12 hours  omeprazole 40 mg oral delayed release capsule: 1 cap(s) orally once a day  ondansetron 4 mg oral tablet, disintegratin tab(s) orally every 6 hours as needed for -for nausea MDD:4 tabs  oxyCODONE 5 mg/5 mL oral solution: 5 milliliter(s) orally every 8 hours as needed for - for severe pain MDD: 15mL  pantoprazole 40 mg oral delayed release tablet: 1 tab(s) orally once a day before breakfast  Tylenol Extra Strength  mg-25 mg oral tablet: 2 tab(s) orally once a day (at bedtime)  Valium 5 mg oral tablet: 1 tab(s) orally 4 times a day, As Needed  Zoloft 50 mg oral tablet: 1 tab(s) orally once a day (at bedtime)  ZyrTEC: 1 tab(s) orally , As Needed   Align 4 mg oral capsule: 1 cap(s) orally once a day in morning  biotin 5000 mcg oral tablet, disintegratin tab(s) orally once a day in morning - last dose 2016  Cytoxan: 175 milligram(s)    Lasix 20 mg oral tablet: 1 tab(s) orally once a day (in the morning) DO NOT TAKE if dizzy or lightheaded  Levsin 0.125 mg oral tablet: 1 tab(s) orally , As Needed  NIFEdipine 30 mg oral tablet, extended release: 1 tab(s) orally every 12 hours  omeprazole 40 mg oral delayed release capsule: 1 cap(s) orally once a day  ondansetron 4 mg oral tablet, disintegratin tab(s) orally every 6 hours as needed for -for nausea MDD:4 tabs  oxyCODONE 5 mg/5 mL oral solution: 5 milliliter(s) orally every 8 hours as needed for - for severe pain MDD: 15mL  Tylenol Extra Strength  mg-25 mg oral tablet: 2 tab(s) orally once a day (at bedtime)  Valium 5 mg oral tablet: 1 tab(s) orally 4 times a day, As Needed  Zoloft 50 mg oral tablet: 1 tab(s) orally once a day (at bedtime)  ZyrTEC: 1 tab(s) orally , As Needed

## 2025-03-12 NOTE — DISCHARGE NOTE PROVIDER - INSTRUCTIONS
Clear liquid diet: broth soups, apple/grape/cranberry juice/jello/ice pops/Full Liquid diet: milk, cream of wheat, strained cream soups, ice cream, sherbert, plain yogurt, meal replacements ( Ensure, Boost, etc)Soft foods: fish, scrambled eggs, oatmeal, baked or mashed potatoes, crackers, soft rice, pasta, steamed or pureed vegetables.  Recommendations:Clear Liquid diet days 1-3 post op  Full Liquid diet: Days 4-7 post op  Soft Food Diet: Day 8 post op: avoid caffeine/ carbonated beverages, and chewing gum

## 2025-03-12 NOTE — DISCHARGE NOTE PROVIDER - HOSPITAL COURSE
79F PMH prediabetes, palpitations (on atenolol PRN), and surgical history of right breast lumpectomy for tx breast CA who presented for endoscopic gastroplasty. Post operative course complicated by hypertensive urgency refractory to IV labetalol, hydralazine. Patient was admitted for further monitoring of Blood pressure.     NEURO  #Anxiety/depression   home med: zoloft 50 mg qd   -Continue home medication as above     PULMONARY  GUSTAVO, breathing comfortably on room air     CARDIAC  #HTN Urgency   S/p endoscopic sleeve gastroplasty, found to have N/V with SBP ranging 160-206 post-op with abdominal discomfort, otherwise asymptomatic (denies HA/vision changes/flank pain); suspect iso post-operative pain however given pain occasionally radiating to epigastric/chest area, will r/o cardiac etiology; no history of HTN; per pt SBP ranges 90-100s and takes atenolol as home med only for palpitations   -Trops negative  -EKG with stable T wave inversion III, q waves in III. no new ischemic changes noted  - labetalol 5 mg IV PRN for SBP > 170 w/ hold parameters  -transitioned to nifedipine PO, trial of lasix 20 mg IV given as patient had received fluids intra-op, adequate urine output with good BP response  Prior to discharge BP range 142-146/, plan to discharge on PO chlorthalidone and nifedipine    #Palpitations   home med: atenolol 10 mg PO QD PRN; no active symptoms at this time   -resume home med, will restart given abrupt withdrawal of atenolol may lead to sympathetic hyper-response and can be contributing to HTN    GASTROINTESTINAL  #S/p endoscopic sleeve gastroplasty   #N/V  Likely iso post-operative state (s/p endoscopic sleeve gastroplasty 3/10); 2 episodes vomiting (blood tinged with mucous) without pepe blood or coffee ground emesis; will require adequate pain control   -Zofran 4 mg IVP Q6H PRN for N/V  -PPI IV QD   -Diet instructions given to patient for discharge    New medications: Chlorthalidone, Nifedipine  Changes to old medications: None  Medications to be stopped: None    Physical exam at the time of discharge:  General: resting comfortably, in no acute distress   HEENT:  NC/AT, EOMI, sclera anicteric, PERRL       Lungs: Bilateral BS  Cardiovascular: RRR, nl s1, s2, no m/r/g appreciated  Gastrointestinal: abdomen distended but soft and mildly tender to deep palpation diffusely, bowel sounds present  Musculoskeletal: No clubbing.  Moves all extremities.    Skin: Warm, dry, intact  Neurological: A&Ox3, strength 5/5 and sensation intact in all extremities

## 2025-03-12 NOTE — DISCHARGE NOTE NURSING/CASE MANAGEMENT/SOCIAL WORK - FINANCIAL ASSISTANCE
[facility] provides services at a reduced cost to those who are determined to be eligible through [facility]’s financial assistance program. Information regarding [facility]’s financial assistance program can be found by going to [email or web address] or by calling [phone]. No

## 2025-03-12 NOTE — PROGRESS NOTE ADULT - ASSESSMENT
9F PMH prediabetes, palpitations (on atenolol PRN), and surgical history of right breast lumpectomy for tx breast CA who presents today for endoscopic gastroplasty, course c/b HTN urgency post-operatively likely in the setting of abdominal discomfort versus less likely cardiac etiology vs Selin Oates tear (lower suspicion given some degree of blood specked emesis is to be expected post operatively and no pepe blood/persistent retching/coffee ground emesis has been witnessed thus far). ICU consulted for HTN urgency     NEURO  #Anxiety/depression   home med: zoloft 50 mg qd   -Continue home medication as above     PULMONARY  GUSTAVO, breathing comfortably on room air     CARDIAC  #HTN Urgency   S/p endoscopic sleeve gastroplasty today and found to have N/V with SBP ranging 160-206 post-op with abdominal discomfort, otherwise asymptomatic (denies HA/vision changes/flank pain); suspect iso post-operative pain however given pain occasionally radiating to epigastric/chest area, will r/o cardiac etiology; no history of HTN; per pt SBP ranges 90-100s and takes atenolol as home med only for palpitations   -Trops negative  -EKG with stable T wave inversion III, q waves in III  - labetalol 5 mg IV PRN for SBP > 170 w/ hold parameters  -transition to nifedipine PO    #Palpitations   home med: atenolol 10 mg PO QD PRN; no active symptoms at this time   -resume home med, will restart given abrupt withdrawal of atenolol may lead to sympathetic hyper-response and can be contributing to HTN    GASTROINTESTINAL  #S/p endoscopic sleeve gastroplasty   #N/V  Likely iso post-operative state (s/p endoscopic sleeve gastroplasty 3/10); 2 episodes vomiting (blood tinged with mucous) without pepe blood or coffee ground emesis; will require adequate pain control   -Zofran 4 mg IVP Q6H PRN for N/V  -PPI IV QD   -Surgery team will continue to follow. Recs appreciated   -CLD  -Pain control     RENAL  GUSTAVO     INFECTIOUS  Afebrile, no s/s infection    ENDOCRINE  -Q6H FGS    HEME  GUSTAVO    MISC  Fluids: none  Diet: CLD  Electrolytes: replete K>3.8, Mg>1.8, Phos>2.5 IV  DVT Prophylaxis: SCDs, chemoprophylaxis decision pending lab work   GI Prophylaxis: Pantoprazole 40 mg IV daily  Code Status: Full code  Dispo: Med telemetry    9F PMH prediabetes, palpitations (on atenolol PRN), and surgical history of right breast lumpectomy for tx breast CA who presents today for endoscopic gastroplasty, course c/b HTN urgency post-operatively likely in the setting of abdominal discomfort versus less likely cardiac etiology vs Selin Oates tear (lower suspicion given some degree of blood specked emesis is to be expected post operatively and no pepe blood/persistent retching/coffee ground emesis has been witnessed thus far). ICU consulted for HTN urgency     NEURO  #Anxiety/depression   home med: zoloft 50 mg qd   -Continue home medication as above     PULMONARY  GUSTAVO, breathing comfortably on room air     CARDIAC  #HTN Urgency   S/p endoscopic sleeve gastroplasty, found to have N/V with SBP ranging 160-206 post-op with abdominal discomfort, otherwise asymptomatic (denies HA/vision changes/flank pain); suspect iso post-operative pain however given pain occasionally radiating to epigastric/chest area, will r/o cardiac etiology; no history of HTN; per pt SBP ranges 90-100s and takes atenolol as home med only for palpitations   -Trops negative  -EKG with stable T wave inversion III, q waves in III. no new ischemic changes noted  - labetalol 5 mg IV PRN for SBP > 170 w/ hold parameters  -transitioned to nifedipine PO, trial of lasix 20 mg IV given as patient had received fluids intra-op, adequate urine output with good BP response  Prior to discharge BP range 142-146/, plan to discharge on PO chlorthalidone and nifedipine    #Palpitations   home med: atenolol 10 mg PO QD PRN; no active symptoms at this time   -resume home med, will restart given abrupt withdrawal of atenolol may lead to sympathetic hyper-response and can be contributing to HTN    GASTROINTESTINAL  #S/p endoscopic sleeve gastroplasty   #N/V  Likely iso post-operative state (s/p endoscopic sleeve gastroplasty 3/10); 2 episodes vomiting (blood tinged with mucous) without pepe blood or coffee ground emesis; will require adequate pain control   -Zofran 4 mg IVP Q6H PRN for N/V  -PPI IV QD   -Diet instructions given to patient for discharge    RENAL  GUSTAVO     INFECTIOUS  Afebrile, no s/s infection    ENDOCRINE  -Q6H FGS    HEME  GUSTAVO    MISC  Fluids: none  Diet: CLD  Electrolytes: replete K>3.8, Mg>1.8, Phos>2.5 IV  DVT Prophylaxis: SCDs, chemoprophylaxis decision pending lab work   GI Prophylaxis: Pantoprazole 40 mg IV daily  Code Status: Full code  Dispo: Med telemetry

## 2025-03-12 NOTE — PROGRESS NOTE ADULT - SUBJECTIVE AND OBJECTIVE BOX
SUBJECTIVE: Pt seen and examined at bedside this am by surgery team. Tolerating diet, pain well controlled. Denies f/n/v/cp/sob.    MEDICATIONS  (STANDING):  acetaminophen   Oral Liquid .. 650 milliGRAM(s) Oral every 6 hours  melatonin 5 milliGRAM(s) Oral at bedtime  NIFEdipine XL 30 milliGRAM(s) Oral every 12 hours  pantoprazole  Injectable 40 milliGRAM(s) IV Push daily  rosuvastatin 10 milliGRAM(s) Oral at bedtime  sertraline 50 milliGRAM(s) Oral every 24 hours    MEDICATIONS  (PRN):  ondansetron Injectable 4 milliGRAM(s) IV Push every 6 hours PRN Nausea      Vital Signs Last 24 Hrs  T(C): 36.8 (12 Mar 2025 01:14), Max: 36.9 (11 Mar 2025 10:51)  T(F): 98.2 (12 Mar 2025 01:14), Max: 98.4 (11 Mar 2025 10:51)  HR: 66 (12 Mar 2025 05:41) (62 - 90)  BP: 168/74 (12 Mar 2025 05:41) (140/63 - 218/84)  BP(mean): 106 (12 Mar 2025 05:41) (90 - 120)  RR: 16 (12 Mar 2025 03:50) (16 - 18)  SpO2: 92% (12 Mar 2025 05:41) (92% - 97%)    Parameters below as of 12 Mar 2025 03:50  Patient On (Oxygen Delivery Method): room air        PHYSICAL EXAM:      Constitutional: A&Ox3    Breasts:    Respiratory: non labored breathing, no respiratory distress    Cardiovascular: NSR, RRR    Gastrointestinal:                 Incision:    Genitourinary:    Extremities: (-) edema                  I&O's Detail    10 Mar 2025 07:01  -  11 Mar 2025 07:00  --------------------------------------------------------  IN:    Oral Fluid: 80 mL  Total IN: 80 mL    OUT:    Voided (mL): 1155 mL  Total OUT: 1155 mL    Total NET: -1075 mL      11 Mar 2025 07:01  -  12 Mar 2025 06:27  --------------------------------------------------------  IN:  Total IN: 0 mL    OUT:    Voided (mL): 1900 mL  Total OUT: 1900 mL    Total NET: -1900 mL          LABS:                        12.1   9.63  )-----------( 228      ( 11 Mar 2025 08:18 )             37.0     03-11    139  |  103  |  15  ----------------------------<  131[H]  3.7   |  23  |  0.83    Ca    9.1      11 Mar 2025 08:18  Phos  2.9     03-11  Mg     2.1     03-11    TPro  7.3  /  Alb  4.0  /  TBili  0.4  /  DBili  x   /  AST  22  /  ALT  15  /  AlkPhos  45  03-11      Urinalysis Basic - ( 11 Mar 2025 08:18 )    Color: x / Appearance: x / SG: x / pH: x  Gluc: 131 mg/dL / Ketone: x  / Bili: x / Urobili: x   Blood: x / Protein: x / Nitrite: x   Leuk Esterase: x / RBC: x / WBC x   Sq Epi: x / Non Sq Epi: x / Bacteria: x        RADIOLOGY & ADDITIONAL STUDIES: SUBJECTIVE: Pt seen and examined at bedside this am by surgery team. Tolerating diet, pain well controlled. Denies f/n/v/cp/sob.    MEDICATIONS  (STANDING):  acetaminophen   Oral Liquid .. 650 milliGRAM(s) Oral every 6 hours  melatonin 5 milliGRAM(s) Oral at bedtime  NIFEdipine XL 30 milliGRAM(s) Oral every 12 hours  pantoprazole  Injectable 40 milliGRAM(s) IV Push daily  rosuvastatin 10 milliGRAM(s) Oral at bedtime  sertraline 50 milliGRAM(s) Oral every 24 hours    MEDICATIONS  (PRN):  ondansetron Injectable 4 milliGRAM(s) IV Push every 6 hours PRN Nausea      Vital Signs Last 24 Hrs  T(C): 36.8 (12 Mar 2025 01:14), Max: 36.9 (11 Mar 2025 10:51)  T(F): 98.2 (12 Mar 2025 01:14), Max: 98.4 (11 Mar 2025 10:51)  HR: 66 (12 Mar 2025 05:41) (62 - 90)  BP: 168/74 (12 Mar 2025 05:41) (140/63 - 218/84)  BP(mean): 106 (12 Mar 2025 05:41) (90 - 120)  RR: 16 (12 Mar 2025 03:50) (16 - 18)  SpO2: 92% (12 Mar 2025 05:41) (92% - 97%)    Parameters below as of 12 Mar 2025 03:50  Patient On (Oxygen Delivery Method): room air        PHYSICAL EXAM:      Constitutional: A&Ox3, nad  Respiratory: non labored breathing, no respiratory distress  Cardiovascular: NSR, RRR  Gastrointestinal: abd soft, NT, ND  Genitourinary: voiding  Extremities: (-) edema, wwp, SCDs in place                  I&O's Detail    10 Mar 2025 07:01  -  11 Mar 2025 07:00  --------------------------------------------------------  IN:    Oral Fluid: 80 mL  Total IN: 80 mL    OUT:    Voided (mL): 1155 mL  Total OUT: 1155 mL    Total NET: -1075 mL      11 Mar 2025 07:01  -  12 Mar 2025 06:27  --------------------------------------------------------  IN:  Total IN: 0 mL    OUT:    Voided (mL): 1900 mL  Total OUT: 1900 mL    Total NET: -1900 mL          LABS:                        12.1   9.63  )-----------( 228      ( 11 Mar 2025 08:18 )             37.0     03-11    139  |  103  |  15  ----------------------------<  131[H]  3.7   |  23  |  0.83    Ca    9.1      11 Mar 2025 08:18  Phos  2.9     03-11  Mg     2.1     03-11    TPro  7.3  /  Alb  4.0  /  TBili  0.4  /  DBili  x   /  AST  22  /  ALT  15  /  AlkPhos  45  03-11      Urinalysis Basic - ( 11 Mar 2025 08:18 )    Color: x / Appearance: x / SG: x / pH: x  Gluc: 131 mg/dL / Ketone: x  / Bili: x / Urobili: x   Blood: x / Protein: x / Nitrite: x   Leuk Esterase: x / RBC: x / WBC x   Sq Epi: x / Non Sq Epi: x / Bacteria: x        RADIOLOGY & ADDITIONAL STUDIES: SUBJECTIVE: Pt seen and examined at bedside this am by surgery team. Tolerating diet, no current complaints.    MEDICATIONS  (STANDING):  acetaminophen   Oral Liquid .. 650 milliGRAM(s) Oral every 6 hours  melatonin 5 milliGRAM(s) Oral at bedtime  NIFEdipine XL 30 milliGRAM(s) Oral every 12 hours  pantoprazole  Injectable 40 milliGRAM(s) IV Push daily  rosuvastatin 10 milliGRAM(s) Oral at bedtime  sertraline 50 milliGRAM(s) Oral every 24 hours    MEDICATIONS  (PRN):  ondansetron Injectable 4 milliGRAM(s) IV Push every 6 hours PRN Nausea      Vital Signs Last 24 Hrs  T(C): 36.8 (12 Mar 2025 01:14), Max: 36.9 (11 Mar 2025 10:51)  T(F): 98.2 (12 Mar 2025 01:14), Max: 98.4 (11 Mar 2025 10:51)  HR: 66 (12 Mar 2025 05:41) (62 - 90)  BP: 168/74 (12 Mar 2025 05:41) (140/63 - 218/84)  BP(mean): 106 (12 Mar 2025 05:41) (90 - 120)  RR: 16 (12 Mar 2025 03:50) (16 - 18)  SpO2: 92% (12 Mar 2025 05:41) (92% - 97%)    Parameters below as of 12 Mar 2025 03:50  Patient On (Oxygen Delivery Method): room air        PHYSICAL EXAM:      Constitutional: A&Ox3, nad  Respiratory: non labored breathing, no respiratory distress  Cardiovascular: NSR, RRR  Gastrointestinal: abd soft, NT, ND  Genitourinary: voiding  Extremities: (-) edema, wwp, SCDs in place                  I&O's Detail    10 Mar 2025 07:01  -  11 Mar 2025 07:00  --------------------------------------------------------  IN:    Oral Fluid: 80 mL  Total IN: 80 mL    OUT:    Voided (mL): 1155 mL  Total OUT: 1155 mL    Total NET: -1075 mL      11 Mar 2025 07:01  -  12 Mar 2025 06:27  --------------------------------------------------------  IN:  Total IN: 0 mL    OUT:    Voided (mL): 1900 mL  Total OUT: 1900 mL    Total NET: -1900 mL          LABS:                        12.1   9.63  )-----------( 228      ( 11 Mar 2025 08:18 )             37.0     03-11    139  |  103  |  15  ----------------------------<  131[H]  3.7   |  23  |  0.83    Ca    9.1      11 Mar 2025 08:18  Phos  2.9     03-11  Mg     2.1     03-11    TPro  7.3  /  Alb  4.0  /  TBili  0.4  /  DBili  x   /  AST  22  /  ALT  15  /  AlkPhos  45  03-11      Urinalysis Basic - ( 11 Mar 2025 08:18 )    Color: x / Appearance: x / SG: x / pH: x  Gluc: 131 mg/dL / Ketone: x  / Bili: x / Urobili: x   Blood: x / Protein: x / Nitrite: x   Leuk Esterase: x / RBC: x / WBC x   Sq Epi: x / Non Sq Epi: x / Bacteria: x        RADIOLOGY & ADDITIONAL STUDIES:

## 2025-03-12 NOTE — PROGRESS NOTE ADULT - REASON FOR ADMISSION
Hypertension post endoscopic sleeve gastroplasty

## 2025-03-12 NOTE — DISCHARGE NOTE PROVIDER - NSDCFUSCHEDAPPT_GEN_ALL_CORE_FT
Hernan Contreras  [facility]  [address]  Scheduled Appointment: 04/03/2025    [facility]  PULED [address] [facility]  Scheduled Appointment: 04/28/2025    Nir Mcneal  [facility] [address] [facility]  Scheduled Appointment: 04/28/2025

## 2025-03-12 NOTE — PROGRESS NOTE ADULT - SUBJECTIVE AND OBJECTIVE BOX
OVERNIGHT EVENTS:    SUBJECTIVE / INTERVAL HPI: Patient seen and examined at bedside.     VITAL SIGNS:  Vital Signs Last 24 Hrs  T(C): 36.8 (12 Mar 2025 01:14), Max: 36.9 (11 Mar 2025 10:51)  T(F): 98.2 (12 Mar 2025 01:14), Max: 98.4 (11 Mar 2025 10:51)  HR: 66 (12 Mar 2025 05:41) (62 - 90)  BP: 168/74 (12 Mar 2025 05:41) (140/63 - 218/84)  BP(mean): 106 (12 Mar 2025 05:41) (90 - 120)  RR: 16 (12 Mar 2025 03:50) (16 - 18)  SpO2: 92% (12 Mar 2025 05:41) (92% - 97%)    Parameters below as of 12 Mar 2025 03:50  Patient On (Oxygen Delivery Method): room air      I&O's Summary    10 Mar 2025 07:01  -  11 Mar 2025 07:00  --------------------------------------------------------  IN: 80 mL / OUT: 1155 mL / NET: -1075 mL    11 Mar 2025 07:01  -  12 Mar 2025 06:13  --------------------------------------------------------  IN: 0 mL / OUT: 1900 mL / NET: -1900 mL        PHYSICAL EXAM:    General: WDWN  HEENT: NC/AT; PERRL, anicteric sclera; MMM  Neck: supple  Cardiovascular: +S1/S2; RRR  Respiratory: CTA B/L; no W/R/R  Gastrointestinal: soft, NT/ND; +BSx4  Extremities: WWP; no edema, clubbing or cyanosis  Vascular: 2+ radial, DP/PT pulses B/L  Neurological: AAOx3; no focal deficits    MEDICATIONS:  MEDICATIONS  (STANDING):  acetaminophen   Oral Liquid .. 650 milliGRAM(s) Oral every 6 hours  melatonin 5 milliGRAM(s) Oral at bedtime  NIFEdipine XL 30 milliGRAM(s) Oral every 12 hours  pantoprazole  Injectable 40 milliGRAM(s) IV Push daily  rosuvastatin 10 milliGRAM(s) Oral at bedtime  sertraline 50 milliGRAM(s) Oral every 24 hours    MEDICATIONS  (PRN):  ondansetron Injectable 4 milliGRAM(s) IV Push every 6 hours PRN Nausea      ALLERGIES:  Allergies    Sudafed (Hives)    Intolerances        LABS:                        12.1   9.63  )-----------( 228      ( 11 Mar 2025 08:18 )             37.0     03-11    139  |  103  |  15  ----------------------------<  131[H]  3.7   |  23  |  0.83    Ca    9.1      11 Mar 2025 08:18  Phos  2.9     03-11  Mg     2.1     03-11    TPro  7.3  /  Alb  4.0  /  TBili  0.4  /  DBili  x   /  AST  22  /  ALT  15  /  AlkPhos  45  03-11      Urinalysis Basic - ( 11 Mar 2025 08:18 )    Color: x / Appearance: x / SG: x / pH: x  Gluc: 131 mg/dL / Ketone: x  / Bili: x / Urobili: x   Blood: x / Protein: x / Nitrite: x   Leuk Esterase: x / RBC: x / WBC x   Sq Epi: x / Non Sq Epi: x / Bacteria: x      CAPILLARY BLOOD GLUCOSE          RADIOLOGY & ADDITIONAL TESTS: Reviewed.   OVERNIGHT EVENTS: Lasix given    SUBJECTIVE / INTERVAL HPI: Patient seen and examined at bedside. She reports she feels well this morning and is very eager to go home. She feels that her anxiety is leading to increasing of her blood pressure as well. Otherwise, denies chest pain, abdominal pain, nausea, vomiting, dizziness, lightheadedness, fevers, chills.     VITAL SIGNS:  Vital Signs Last 24 Hrs  T(C): 36.8 (12 Mar 2025 01:14), Max: 36.9 (11 Mar 2025 10:51)  T(F): 98.2 (12 Mar 2025 01:14), Max: 98.4 (11 Mar 2025 10:51)  HR: 66 (12 Mar 2025 05:41) (62 - 90)  BP: 168/74 (12 Mar 2025 05:41) (140/63 - 218/84)  BP(mean): 106 (12 Mar 2025 05:41) (90 - 120)  RR: 16 (12 Mar 2025 03:50) (16 - 18)  SpO2: 92% (12 Mar 2025 05:41) (92% - 97%)    Parameters below as of 12 Mar 2025 03:50  Patient On (Oxygen Delivery Method): room air      I&O's Summary    10 Mar 2025 07:01  -  11 Mar 2025 07:00  --------------------------------------------------------  IN: 80 mL / OUT: 1155 mL / NET: -1075 mL    11 Mar 2025 07:01  -  12 Mar 2025 06:13  --------------------------------------------------------  IN: 0 mL / OUT: 1900 mL / NET: -1900 mL        PHYSICAL EXAM:  General: resting comfortably, in no acute distress   HEENT:  NC/AT, EOMI, sclera anicteric, PERRL       Lungs: Bilateral BS  Cardiovascular: RRR, nl s1, s2, no m/r/g appreciated  Gastrointestinal: abdomen distended but soft and mildly tender to deep palpation diffusely, bowel sounds present  Musculoskeletal: No clubbing.  Moves all extremities.    Skin: Warm, dry, intact  Neurological: A&Ox3, strength 5/5 and sensation intact in all extremities      MEDICATIONS:  MEDICATIONS  (STANDING):  acetaminophen   Oral Liquid .. 650 milliGRAM(s) Oral every 6 hours  melatonin 5 milliGRAM(s) Oral at bedtime  NIFEdipine XL 30 milliGRAM(s) Oral every 12 hours  pantoprazole  Injectable 40 milliGRAM(s) IV Push daily  rosuvastatin 10 milliGRAM(s) Oral at bedtime  sertraline 50 milliGRAM(s) Oral every 24 hours    MEDICATIONS  (PRN):  ondansetron Injectable 4 milliGRAM(s) IV Push every 6 hours PRN Nausea      ALLERGIES:  Allergies    Sudafed (Hives)    Intolerances        LABS:                        12.1   9.63  )-----------( 228      ( 11 Mar 2025 08:18 )             37.0     03-11    139  |  103  |  15  ----------------------------<  131[H]  3.7   |  23  |  0.83    Ca    9.1      11 Mar 2025 08:18  Phos  2.9     03-11  Mg     2.1     03-11    TPro  7.3  /  Alb  4.0  /  TBili  0.4  /  DBili  x   /  AST  22  /  ALT  15  /  AlkPhos  45  03-11      Urinalysis Basic - ( 11 Mar 2025 08:18 )    Color: x / Appearance: x / SG: x / pH: x  Gluc: 131 mg/dL / Ketone: x  / Bili: x / Urobili: x   Blood: x / Protein: x / Nitrite: x   Leuk Esterase: x / RBC: x / WBC x   Sq Epi: x / Non Sq Epi: x / Bacteria: x      CAPILLARY BLOOD GLUCOSE          RADIOLOGY & ADDITIONAL TESTS: Reviewed.

## 2025-03-12 NOTE — DISCHARGE NOTE PROVIDER - NSDCCPCAREPLAN_GEN_ALL_CORE_FT
PRINCIPAL DISCHARGE DIAGNOSIS  Diagnosis: Hypertensive urgency  Assessment and Plan of Treatment: High blood pressure is a common condition that affects the body's arteries. It's also called hypertension. If you have high blood pressure, the force of the blood pushing against the artery walls is consistently too high. The heart has to work harder to pump blood.  Blood pressure is measured in millimeters of mercury (mm Hg). In general, hypertension is a blood pressure reading of 130/80 millimeters of mercury (mm Hg) or higher.  The American College of Cardiology and the American Heart Association divide blood pressure into four general categories. Ideal blood pressure is categorized as normal.  Normal blood pressure. Blood pressure is lower than 120/80 mm Hg.  Elevated blood pressure. The top number ranges from 120 to 129 mm Hg and the bottom number is below, not above, 80 mm Hg.  Stage 1 hypertension. The top number ranges from 130 to 139 mm Hg or the bottom number is between 80 and 89 mm Hg.  Stage 2 hypertension. The top number is 140 mm Hg or higher or the bottom number is 90 mm Hg or higher.  Blood pressure higher than 180/120 mm Hg is considered a hypertensive emergency or crisis. Seek emergency medical help for anyone with these blood pressure numbers.  Untreated, high blood pressure increases the risk of heart attack, stroke and other serious health problems  Please follow up with your primary care physician within the next week. Please take the new medications chlorthalidone and Nifedipine with the instructions as prescibed.

## 2025-03-12 NOTE — PROGRESS NOTE ADULT - ASSESSMENT
Patient is a 79F PMHx of HTN on atenolol, preDM, possible angina/palpitations, and PSHx of R lumpectomy for right breast Ca now s/p endoscopic sleeve gastroplasty, now with hypertension refractory to IV Pushes of hydralazine and labetalol, admitted to medicine tele for further management of refractory hypertension. Now transitioned to nifedipine. SBP ranging 148 - 189.    Recs:    rest of care per primary team   Patient is a 79F PMHx of HTN on atenolol, preDM, possible angina/palpitations, and PSHx of R lumpectomy for right breast Ca now s/p endoscopic sleeve gastroplasty, now with hypertension refractory to IV Pushes of hydralazine and labetalol, admitted to medicine tele for further management of refractory hypertension. Now transitioned to nifedipine. SBP ranging 148 - 189.    Recs:  can dc home once BP ok  rest of care per primary team

## 2025-03-12 NOTE — DISCHARGE NOTE PROVIDER - CARE PROVIDER_API CALL
Wilfrido Thomas  Internal Medicine  [address] [place] [address]  Phone: [phone]  Fax: [phone]  Established Patient  Follow Up Time: 1-3 days

## 2025-03-13 ENCOUNTER — NON-APPOINTMENT (OUTPATIENT)
Age: 80
End: 2025-03-13

## 2025-03-18 RX ORDER — LISINOPRIL 30 MG/1
1 TABLET ORAL
Refills: 0 | DISCHARGE

## 2025-03-18 RX ORDER — NIFEDIPINE 30 MG
1 TABLET, EXTENDED RELEASE 24 HR ORAL
Refills: 0 | DISCHARGE

## 2025-03-18 NOTE — CHART NOTE - NSCHARTNOTEFT_GEN_A_CORE
Post-Discharge Medication Review: Completed	  	  Patient's preferred pharmacy was updated in OMR: [facility] [place], [place] 	    Patient contacted to offer medication counseling post-discharge. Medication reconciliation completed. Per patient, medications include:	  	  1.	Cytoxan 175 milligram(s)  2.	Lasix 20 mg oral tablet 1 tab(s) orally once a day (in the morning) DO NOT TAKE if dizzy or lightheaded  3.	Levsin 0.125 mg oral tablet 1 tab(s) orally , As Needed  4.	NIFEdipine 30 mg oral tablet, extended release 1 tab(s) orally daily  5.	omeprazole 40 mg oral delayed release capsule 1 cap(s) orally once a day  6.	oxyCODONE 5 mg/5 mL oral solution 5 milliliter(s) orally every 8 hours as needed for - for severe pain MDD: 15mL  7.	Tylenol Extra Strength  mg-25 mg oral tablet 2 tab(s) orally once a day (at bedtime)  8.	Valium 5 mg oral tablet 1 tab(s) orally 4 times a day, As Needed  9.	Zoloft 50 mg oral tablet 1 tab(s) orally once a day (at bedtime)  10.	ZyrTEC 1 tab(s) orally , As Needed   	  Medications added to OMR (updated per discussion with patient):	  11.	atenolol 10 mg oral tablet 1 tab orally daily    	  Medications removed from OMR (updated per discussion with patient):	  1.	Align 4 mg oral capsule 1 cap(s) orally once a day in morning  2.	biotin 5000 mcg oral tablet, disintegrating 1 tab(s) orally once a day in morning - last dose 12/8/2016  3.	ondansetron 4 mg oral tablet, disintegrating 1 tab(s) orally every 6 hours as needed for -for nausea MDD:4 tabs   	  Medication name, indication, administration, side effect, and monitoring reviewed for new medications during post discharge counseling visit with patient. Patient demonstrated understanding. Counseling offered for all medications.	  	  Per discharge document, patient was instructed to start  NIFEdipine 30 mg oral tablet every 12 hours. However, a prescription was sent for NIFEdipine 30 mg oral tablet daily; informed with inpatient team and patient is to take as daily. Patient made aware.  Patient had an appointment yesterday  with PCP and blood pressure was 98/78. Patient has another follow up appointment in 3 weeks to reevaluate blood pressure. 	  	  Sallie Granados PharmD	  Clinical Pharmacy Specialist, Pharmacy Telehealth Team	  Can be reached via [place] Teams or [phone]

## 2025-03-19 ENCOUNTER — NON-APPOINTMENT (OUTPATIENT)
Age: 80
End: 2025-03-19

## 2025-04-03 ENCOUNTER — APPOINTMENT (OUTPATIENT)
Dept: SURGERY | Facility: CLINIC | Age: 80
End: 2025-04-03
Payer: MEDICARE

## 2025-04-03 ENCOUNTER — APPOINTMENT (OUTPATIENT)
Dept: BARIATRICS | Facility: CLINIC | Age: 80
End: 2025-04-03

## 2025-04-03 ENCOUNTER — NON-APPOINTMENT (OUTPATIENT)
Age: 80
End: 2025-04-03

## 2025-04-03 VITALS
OXYGEN SATURATION: 97 % | BODY MASS INDEX: 32.14 KG/M2 | DIASTOLIC BLOOD PRESSURE: 69 MMHG | HEART RATE: 74 BPM | SYSTOLIC BLOOD PRESSURE: 118 MMHG | HEIGHT: 66 IN | TEMPERATURE: 98.1 F | WEIGHT: 200 LBS

## 2025-04-03 DIAGNOSIS — E66.811 OBESITY, CLASS 1: ICD-10-CM

## 2025-04-03 PROCEDURE — 99214 OFFICE O/P EST MOD 30 MIN: CPT

## 2025-04-28 ENCOUNTER — APPOINTMENT (OUTPATIENT)
Dept: PULMONOLOGY | Facility: CLINIC | Age: 80
End: 2025-04-28
Payer: MEDICARE

## 2025-04-28 VITALS
SYSTOLIC BLOOD PRESSURE: 120 MMHG | HEART RATE: 83 BPM | RESPIRATION RATE: 16 BRPM | HEIGHT: 66 IN | TEMPERATURE: 97.3 F | DIASTOLIC BLOOD PRESSURE: 68 MMHG | OXYGEN SATURATION: 95 %

## 2025-04-28 DIAGNOSIS — E66.811 OBESITY, CLASS 1: ICD-10-CM

## 2025-04-28 DIAGNOSIS — Z80.0 FAMILY HISTORY OF MALIGNANT NEOPLASM OF DIGESTIVE ORGANS: ICD-10-CM

## 2025-04-28 DIAGNOSIS — E66.01 MORBID (SEVERE) OBESITY DUE TO EXCESS CALORIES: ICD-10-CM

## 2025-04-28 DIAGNOSIS — Z78.9 OTHER SPECIFIED HEALTH STATUS: ICD-10-CM

## 2025-04-28 DIAGNOSIS — Z87.891 PERSONAL HISTORY OF NICOTINE DEPENDENCE: ICD-10-CM

## 2025-04-28 DIAGNOSIS — J30.89 OTHER ALLERGIC RHINITIS: ICD-10-CM

## 2025-04-28 DIAGNOSIS — R91.8 OTHER NONSPECIFIC ABNORMAL FINDING OF LUNG FIELD: ICD-10-CM

## 2025-04-28 DIAGNOSIS — R06.02 SHORTNESS OF BREATH: ICD-10-CM

## 2025-04-28 PROCEDURE — 94799 UNLISTED PULMONARY SVC/PX: CPT

## 2025-04-28 PROCEDURE — 94618 PULMONARY STRESS TESTING: CPT

## 2025-04-28 PROCEDURE — 94729 DIFFUSING CAPACITY: CPT

## 2025-04-28 PROCEDURE — 71046 X-RAY EXAM CHEST 2 VIEWS: CPT

## 2025-04-28 PROCEDURE — 99214 OFFICE O/P EST MOD 30 MIN: CPT | Mod: 25

## 2025-04-28 PROCEDURE — 94060 EVALUATION OF WHEEZING: CPT

## 2025-04-28 PROCEDURE — 94727 GAS DIL/WSHOT DETER LNG VOL: CPT

## 2025-04-28 PROCEDURE — ZZZZZ: CPT

## 2025-04-28 PROCEDURE — 95012 NITRIC OXIDE EXP GAS DETER: CPT

## 2025-04-28 RX ORDER — OLOPATADINE HYDROCHLORIDE 665 UG/1
0.6 SPRAY, METERED NASAL
Qty: 1 | Refills: 3 | Status: ACTIVE | COMMUNITY
Start: 2025-04-28 | End: 1900-01-01

## 2025-07-30 ENCOUNTER — NON-APPOINTMENT (OUTPATIENT)
Age: 80
End: 2025-07-30

## 2025-07-31 ENCOUNTER — APPOINTMENT (OUTPATIENT)
Dept: BARIATRICS | Facility: CLINIC | Age: 80
End: 2025-07-31
Payer: MEDICARE

## 2025-07-31 VITALS
BODY MASS INDEX: 28.45 KG/M2 | TEMPERATURE: 97.2 F | HEART RATE: 56 BPM | SYSTOLIC BLOOD PRESSURE: 108 MMHG | OXYGEN SATURATION: 98 % | HEIGHT: 66 IN | DIASTOLIC BLOOD PRESSURE: 59 MMHG | WEIGHT: 177 LBS

## 2025-07-31 PROCEDURE — 99214 OFFICE O/P EST MOD 30 MIN: CPT

## 2025-08-06 ENCOUNTER — APPOINTMENT (OUTPATIENT)
Dept: PULMONOLOGY | Facility: CLINIC | Age: 80
End: 2025-08-06
Payer: MEDICARE

## 2025-08-06 VITALS
SYSTOLIC BLOOD PRESSURE: 115 MMHG | OXYGEN SATURATION: 98 % | HEIGHT: 66 IN | HEART RATE: 60 BPM | BODY MASS INDEX: 28.45 KG/M2 | WEIGHT: 177 LBS | TEMPERATURE: 97.7 F | RESPIRATION RATE: 16 BRPM | DIASTOLIC BLOOD PRESSURE: 60 MMHG

## 2025-08-06 DIAGNOSIS — J30.89 OTHER ALLERGIC RHINITIS: ICD-10-CM

## 2025-08-06 DIAGNOSIS — R91.8 OTHER NONSPECIFIC ABNORMAL FINDING OF LUNG FIELD: ICD-10-CM

## 2025-08-06 DIAGNOSIS — Z87.891 PERSONAL HISTORY OF NICOTINE DEPENDENCE: ICD-10-CM

## 2025-08-06 DIAGNOSIS — R06.02 SHORTNESS OF BREATH: ICD-10-CM

## 2025-08-06 PROCEDURE — 99214 OFFICE O/P EST MOD 30 MIN: CPT | Mod: 25

## (undated) DEVICE — SUT OVERSTITCH POLYPROPYLENE 2-0

## (undated) DEVICE — SUT OVERSTITCH ENDOSCOPIC SYS

## (undated) DEVICE — PROBE FIAPC DIA 2.3MM/7FR LNTH 220CM/7.2FT

## (undated) DEVICE — ELCTR GROUNDING PAD ADULT COVIDIEN